# Patient Record
Sex: MALE | Race: WHITE | NOT HISPANIC OR LATINO | Employment: OTHER | URBAN - METROPOLITAN AREA
[De-identification: names, ages, dates, MRNs, and addresses within clinical notes are randomized per-mention and may not be internally consistent; named-entity substitution may affect disease eponyms.]

---

## 2020-05-08 ENCOUNTER — CLINICAL SUPPORT (OUTPATIENT)
Dept: URGENT CARE | Facility: CLINIC | Age: 60
End: 2020-05-08
Payer: MEDICAID

## 2020-05-08 VITALS
HEART RATE: 89 BPM | TEMPERATURE: 97 F | BODY MASS INDEX: 35.93 KG/M2 | HEIGHT: 70 IN | WEIGHT: 251 LBS | OXYGEN SATURATION: 97 % | SYSTOLIC BLOOD PRESSURE: 140 MMHG | DIASTOLIC BLOOD PRESSURE: 100 MMHG

## 2020-05-08 DIAGNOSIS — M10.9 ACUTE GOUT, UNSPECIFIED CAUSE, UNSPECIFIED SITE: Primary | ICD-10-CM

## 2020-05-08 PROCEDURE — 99204 PR OFFICE/OUTPT VISIT, NEW, LEVL IV, 45-59 MIN: ICD-10-PCS | Mod: 25,S$GLB,, | Performed by: NURSE PRACTITIONER

## 2020-05-08 PROCEDURE — 99204 OFFICE O/P NEW MOD 45 MIN: CPT | Mod: 25,S$GLB,, | Performed by: NURSE PRACTITIONER

## 2020-05-08 RX ORDER — DEXAMETHASONE SODIUM PHOSPHATE 4 MG/ML
8 INJECTION, SOLUTION INTRA-ARTICULAR; INTRALESIONAL; INTRAMUSCULAR; INTRAVENOUS; SOFT TISSUE ONCE
Status: COMPLETED | OUTPATIENT
Start: 2020-05-08 | End: 2020-05-08

## 2020-05-08 RX ORDER — PREDNISONE 20 MG/1
40 TABLET ORAL DAILY
Qty: 14 TABLET | Refills: 0 | Status: SHIPPED | OUTPATIENT
Start: 2020-05-08 | End: 2020-05-15

## 2020-05-08 RX ORDER — INDOMETHACIN 50 MG/1
50 CAPSULE ORAL
Qty: 15 CAPSULE | Refills: 0 | Status: SHIPPED | OUTPATIENT
Start: 2020-05-08

## 2020-05-08 RX ADMIN — DEXAMETHASONE SODIUM PHOSPHATE 8 MG: 4 INJECTION, SOLUTION INTRA-ARTICULAR; INTRALESIONAL; INTRAMUSCULAR; INTRAVENOUS; SOFT TISSUE at 02:05

## 2020-05-08 NOTE — PROGRESS NOTES
"Subjective:       Patient ID: Ben Lerma is a 60 y.o. male.    Vitals:  height is 5' 10" (1.778 m) and weight is 113.9 kg (251 lb). His oral temperature is 97.4 °F (36.3 °C). His blood pressure is 140/100 (abnormal) and his pulse is 89. His oxygen saturation is 97%.     Chief Complaint: Knee Pain    Knee Pain    The incident occurred 2 days ago. There was no injury mechanism. The pain is present in the right knee. The quality of the pain is described as aching, shooting and stabbing. The pain is severe. The pain has been constant since onset. Associated symptoms include an inability to bear weight and muscle weakness. He reports no foreign bodies present. The symptoms are aggravated by movement and weight bearing. He has tried nothing for the symptoms.       Constitution: Negative for chills, fatigue and fever.   HENT: Negative for congestion and sore throat.    Neck: Negative for painful lymph nodes.   Cardiovascular: Negative for chest pain and leg swelling.   Eyes: Negative for double vision and blurred vision.   Respiratory: Negative for cough and shortness of breath.    Gastrointestinal: Negative for nausea, vomiting and diarrhea.   Genitourinary: Negative for dysuria, frequency and urgency.   Musculoskeletal: Positive for pain, joint pain and joint swelling. Negative for muscle cramps and muscle ache.   Skin: Negative for color change, pale and rash.   Allergic/Immunologic: Negative for seasonal allergies.   Neurological: Negative for dizziness, history of vertigo, light-headedness, passing out and headaches.   Hematologic/Lymphatic: Negative for swollen lymph nodes, easy bruising/bleeding and history of blood clots. Does not bruise/bleed easily.   Psychiatric/Behavioral: Negative for nervous/anxious, sleep disturbance and depression. The patient is not nervous/anxious.        Objective:      Physical Exam   Constitutional: He is oriented to person, place, and time. He appears well-developed and " well-nourished. He is cooperative.  Non-toxic appearance. He does not appear ill. No distress.   HENT:   Head: Normocephalic and atraumatic.   Right Ear: Hearing, tympanic membrane, external ear and ear canal normal.   Left Ear: Hearing, tympanic membrane, external ear and ear canal normal.   Nose: Nose normal. No mucosal edema, rhinorrhea or nasal deformity. No epistaxis. Right sinus exhibits no maxillary sinus tenderness and no frontal sinus tenderness. Left sinus exhibits no maxillary sinus tenderness and no frontal sinus tenderness.   Mouth/Throat: Uvula is midline, oropharynx is clear and moist and mucous membranes are normal. No trismus in the jaw. Normal dentition. No uvula swelling. No posterior oropharyngeal erythema.   Eyes: Conjunctivae and lids are normal. Right eye exhibits no discharge. Left eye exhibits no discharge. No scleral icterus.   Neck: Trachea normal, normal range of motion, full passive range of motion without pain and phonation normal. Neck supple.   Cardiovascular: Normal rate, regular rhythm, normal heart sounds, intact distal pulses and normal pulses.   Pulmonary/Chest: Effort normal and breath sounds normal. No respiratory distress.   Abdominal: Soft. Normal appearance and bowel sounds are normal. He exhibits no distension, no pulsatile midline mass and no mass. There is no tenderness.   Musculoskeletal: He exhibits no edema or deformity.        Right knee: He exhibits decreased range of motion and swelling.   Neurological: He is alert and oriented to person, place, and time. He exhibits normal muscle tone. Coordination normal.   Skin: Skin is warm, dry, intact, not diaphoretic and not pale.   Psychiatric: He has a normal mood and affect. His speech is normal and behavior is normal. Judgment and thought content normal. Cognition and memory are normal.   Nursing note and vitals reviewed.        Assessment:       1. Acute gout, unspecified cause, unspecified site        Plan:       Rx:  Norco 5/325 # 20  Acute gout, unspecified cause, unspecified site  -     dexamethasone injection 8 mg  -     indomethacin (INDOCIN) 50 MG capsule; Take 1 capsule (50 mg total) by mouth 3 (three) times daily with meals.  Dispense: 15 capsule; Refill: 0  -     predniSONE (DELTASONE) 20 MG tablet; Take 2 tablets (40 mg total) by mouth once daily. for 7 days  Dispense: 14 tablet; Refill: 0

## 2020-05-08 NOTE — PATIENT INSTRUCTIONS

## 2023-12-27 ENCOUNTER — OFFICE VISIT (OUTPATIENT)
Dept: RHEUMATOLOGY | Facility: CLINIC | Age: 63
End: 2023-12-27
Payer: MEDICARE

## 2023-12-27 VITALS
WEIGHT: 253.63 LBS | HEIGHT: 70 IN | BODY MASS INDEX: 36.31 KG/M2 | HEART RATE: 60 BPM | DIASTOLIC BLOOD PRESSURE: 90 MMHG | SYSTOLIC BLOOD PRESSURE: 155 MMHG

## 2023-12-27 DIAGNOSIS — R76.8 HEPATITIS C ANTIBODY TEST POSITIVE: ICD-10-CM

## 2023-12-27 DIAGNOSIS — M1A.9XX0 CHRONIC GOUT WITHOUT TOPHUS, UNSPECIFIED CAUSE, UNSPECIFIED SITE: ICD-10-CM

## 2023-12-27 DIAGNOSIS — I10 HYPERTENSION, UNSPECIFIED TYPE: ICD-10-CM

## 2023-12-27 DIAGNOSIS — L40.50 PSORIATIC ARTHRITIS: Primary | ICD-10-CM

## 2023-12-27 DIAGNOSIS — Z71.89 COUNSELING AND COORDINATION OF CARE: ICD-10-CM

## 2023-12-27 DIAGNOSIS — R76.8 HEPATITIS B CORE ANTIBODY POSITIVE: ICD-10-CM

## 2023-12-27 DIAGNOSIS — L40.9 PSORIASIS: ICD-10-CM

## 2023-12-27 PROCEDURE — 99999 PR PBB SHADOW E&M-NEW PATIENT-LVL V: ICD-10-PCS | Mod: PBBFAC,,, | Performed by: STUDENT IN AN ORGANIZED HEALTH CARE EDUCATION/TRAINING PROGRAM

## 2023-12-27 PROCEDURE — 99205 PR OFFICE/OUTPT VISIT, NEW, LEVL V, 60-74 MIN: ICD-10-PCS | Mod: S$PBB,,, | Performed by: STUDENT IN AN ORGANIZED HEALTH CARE EDUCATION/TRAINING PROGRAM

## 2023-12-27 PROCEDURE — 99999PBSHW PR PBB SHADOW TECHNICAL ONLY FILED TO HB: ICD-10-PCS | Mod: PBBFAC,,,

## 2023-12-27 PROCEDURE — 99205 OFFICE O/P NEW HI 60 MIN: CPT | Mod: 25,PBBFAC,PN | Performed by: STUDENT IN AN ORGANIZED HEALTH CARE EDUCATION/TRAINING PROGRAM

## 2023-12-27 PROCEDURE — 99205 OFFICE O/P NEW HI 60 MIN: CPT | Mod: S$PBB,,, | Performed by: STUDENT IN AN ORGANIZED HEALTH CARE EDUCATION/TRAINING PROGRAM

## 2023-12-27 PROCEDURE — 99999 PR PBB SHADOW E&M-NEW PATIENT-LVL V: CPT | Mod: PBBFAC,,, | Performed by: STUDENT IN AN ORGANIZED HEALTH CARE EDUCATION/TRAINING PROGRAM

## 2023-12-27 PROCEDURE — 96372 THER/PROPH/DIAG INJ SC/IM: CPT | Mod: PBBFAC,PN

## 2023-12-27 PROCEDURE — 99999PBSHW PR PBB SHADOW TECHNICAL ONLY FILED TO HB: Mod: PBBFAC,,,

## 2023-12-27 RX ORDER — METFORMIN HYDROCHLORIDE 500 MG/1
500 TABLET, EXTENDED RELEASE ORAL
COMMUNITY
End: 2024-02-23 | Stop reason: SDUPTHER

## 2023-12-27 RX ORDER — AMLODIPINE BESYLATE 5 MG/1
1 TABLET ORAL
COMMUNITY
Start: 2023-09-13 | End: 2024-02-23

## 2023-12-27 RX ORDER — LISINOPRIL 10 MG/1
10 TABLET ORAL DAILY
Qty: 30 TABLET | Refills: 3 | Status: SHIPPED | OUTPATIENT
Start: 2023-12-27

## 2023-12-27 RX ORDER — ONDANSETRON 4 MG/1
4 TABLET, FILM COATED ORAL
COMMUNITY
Start: 2023-04-12 | End: 2024-02-23

## 2023-12-27 RX ORDER — PREDNISONE 5 MG/1
TABLET ORAL
Qty: 49 TABLET | Refills: 0 | Status: SHIPPED | OUTPATIENT
Start: 2023-12-27 | End: 2024-01-24

## 2023-12-27 RX ORDER — PREDNISONE 20 MG/1
TABLET ORAL
COMMUNITY
Start: 2023-09-13 | End: 2023-12-27

## 2023-12-27 RX ORDER — CETIRIZINE HYDROCHLORIDE 10 MG/1
10 TABLET ORAL EVERY MORNING
COMMUNITY
Start: 2023-09-05

## 2023-12-27 RX ORDER — APREMILAST 30 MG/1
1 TABLET, FILM COATED ORAL 2 TIMES DAILY
COMMUNITY
End: 2024-02-23 | Stop reason: SDUPTHER

## 2023-12-27 RX ORDER — KETOROLAC TROMETHAMINE 30 MG/ML
30 INJECTION, SOLUTION INTRAMUSCULAR; INTRAVENOUS
Status: COMPLETED | OUTPATIENT
Start: 2023-12-27 | End: 2023-12-27

## 2023-12-27 RX ORDER — LISINOPRIL 10 MG/1
1 TABLET ORAL DAILY
COMMUNITY
Start: 2023-01-13 | End: 2023-12-27 | Stop reason: SDUPTHER

## 2023-12-27 RX ADMIN — KETOROLAC TROMETHAMINE 30 MG: 60 INJECTION, SOLUTION INTRAMUSCULAR at 02:12

## 2023-12-27 NOTE — PATIENT INSTRUCTIONS
Continue taking otezla 30 mg twice a day   Do lab work and XR today   Prednisone taper for 1 month   Referral to liver doctor (hepatologist) and infectious diseases doctor for the hepatitis test

## 2023-12-27 NOTE — PROGRESS NOTES
RHEUMATOLOGY OUTPATIENT CLINIC NOTE    12/27/2023    Attending Rheumatologist: Norma Mendoza  Primary Care Provider: No, Primary Doctor   Physician Requesting Consultation: Self, Aaareferral  No address on file  Chief Complaint/Reason For Consultation:  Psoriatic Arthritis      Subjective:       HPI  Ben Lerma is a 63 y.o. White male with hypertension, diabetes who comes for evaluation of psoriatic arthritis     Diagnosis of PsO about 3-4 years ago, involving knees, legs, elbows, low back, scalp. Started on Otezla about a year ago but has not taken it consistently due to some diarrhea and upset stomach. He takes it at least 3 times per week. Reports that sometimes diarrhea is not present when he takes it.   He has a history of positive hep C antibody with negative VL and positive hep B core antibody. Because of this, no other treatment options have been explored yet. He has not seen hepatologist or ID.   He has history of gout for many years, affecting both feet and knees. Has taken indocin and colchicine in the past. Indocin caused mood changes and colchicine gave him upset stomach. Took allopurinol for a while many months ago. Last flare a couple of months ago.   Reports new onset bilateral wrist pain and swelling, also noted pain in multiple knuckles. Reports occasional pain in low back, no buttock pain. Morning stiffness of about 5 min.     He quit smoking cigarettes a while ago. Now only smokes marihuana. She does not drink alcohol. He is retired, used to work offshore. He rides a motocycle and has pain in hands with riding.     Pertinent labs and imaging  10/2022  Hep C antibody reactive  Hep C viral load negative  Hepatitis B core antibody reactive  Hepatitis B e Ab, surface antibody negative  Quantiferon negative  KALEY 1:160 homogeneous   Uric acid 10.7    Chronic comorbid conditions affecting medical decision making today:  Past Medical History:   Diagnosis Date    Allergy      Arthritis     Hypertension     Unspecified viral hepatitis C without hepatic coma      Past Surgical History:   Procedure Laterality Date    DENTAL SURGERY       History reviewed. No pertinent family history.  Social History     Substance and Sexual Activity   Alcohol Use Not Currently     Social History     Tobacco Use   Smoking Status Former    Types: Cigarettes   Smokeless Tobacco Not on file     Social History     Substance and Sexual Activity   Drug Use Yes    Types: Marijuana       Current Outpatient Medications:     cetirizine (ZYRTEC) 10 MG tablet, Take 10 mg by mouth every morning., Disp: , Rfl:     ondansetron (ZOFRAN) 4 MG tablet, Take 4 mg by mouth every 24 hours as needed., Disp: , Rfl:     OTEZLA 30 mg Tab, Take 1 tablet by mouth 2 (two) times daily., Disp: , Rfl:     amLODIPine (NORVASC) 5 MG tablet, 1 tablet., Disp: , Rfl:     indomethacin (INDOCIN) 50 MG capsule, Take 1 capsule (50 mg total) by mouth 3 (three) times daily with meals. (Patient not taking: Reported on 12/27/2023), Disp: 15 capsule, Rfl: 0    lisinopriL 10 MG tablet, Take 1 tablet (10 mg total) by mouth once daily., Disp: 30 tablet, Rfl: 3    metFORMIN (GLUCOPHAGE-XR) 500 MG ER 24hr tablet, Take 500 mg by mouth., Disp: , Rfl:     predniSONE (DELTASONE) 5 MG tablet, Take 3 tablets (15 mg total) by mouth once daily for 7 days, THEN 2 tablets (10 mg total) once daily for 7 days, THEN 1 tablet (5 mg total) once daily for 14 days., Disp: 49 tablet, Rfl: 0  No current facility-administered medications for this visit.     Objective:         Vitals:    12/27/23 1304   BP: (!) 155/90   Pulse: 60     Physical Exam   Constitutional: He is oriented to person, place, and time. He appears well-developed.   HENT:   Head: Normocephalic.   Mouth/Throat: Mucous membranes are moist.   Pulmonary/Chest: Effort normal.   Abdominal: Soft.   Musculoskeletal:      Cervical back: Neck supple.      Comments: Cspine FROM no tenderness  Tspine FROM no  "tenderness  Lspine FROM no tenderness.  Shoulders: FROM; no synovitis;  Elbows: FROM; no synovitis; no tophi or nodules  Wrists: synovitis in bilateral wrist  MCPs: FROM; synovitis in right 2 and 4    PIPs:FROM; synovitis in right 2 and 4   DIPs: FROM; no synovitis;   HIPS: FROM  Knees: FROM; no synovitis; no instability  Ankles: FROM: no synovitis   Toes: no tenderness on palpation.     Lymphadenopathy:     He has no cervical adenopathy.   Neurological: He is alert and oriented to person, place, and time.   Skin: No rash noted.   Multiple areas of psoriatic plaques. See pictures below    Vitals reviewed.       Media Information                    Reviewed old and all outside pertinent medical records available.    All lab results personally reviewed and interpreted by me.  Lab Results   Component Value Date    WBC 11.19 12/27/2023    HGB 13.8 (L) 12/27/2023    HCT 40.2 12/27/2023    MCV 92 12/27/2023    MCH 31.7 (H) 12/27/2023    MCHC 34.3 12/27/2023    RDW 13.3 12/27/2023     12/27/2023    MPV 10.0 12/27/2023    NEUTROABS 4.1 10/12/2022    LYMPHOPCT 39 10/12/2022       No results found for: "NA", "K", "CL", "CO2", "GLU", "BUN", "LABCREA", "CALCIUM", "PROT", "ALBUMIN", "BILITOT", "AST", "ALKPHOS", "ALT", "GFRAA", "GFRNONAA"    No results found for: "COLORU", "APPEARANCEUA", "SPECGRAV", "PHUR", "PHUA", "PROTEINUA", "GLUCOSEU", "KETONESU", "BLOODU", "LEUKOCYTESUR", "NITRITE", "UROBILINOGEN"    No results found for: "CRP"    No results found for: "KALEY", "RF", "SEDRATE", "CCPANTIBODIE"    No components found for: "25OHVITDTOT", "22HHNDEV7", "30YNQIXL5", "METHODNOTE"    No results found for: "URICACID"    No results found for: "QUANTIFERON", "HEPBCOREIGG", "HEPBSAB", "HEPBSAG", "HEPCAB"    Imaging:  All imaging reviewed and independently interpreted by me.         ASSESSMENT / PLAN:     Ben Lerma is a 63 y.o. White male with  hypertension, diabetes who comes for evaluation of psoriatic arthritis     1. " Psoriasis  -Extensive body surface involvement.   -On otezla BID but not taking consistently. Advised him to start taking it from now on. Previously limited therapies due to history of positive Hep C AB and positive hepatitis B core AB so he was started on otezla only  -Will obtain labs again now    2. Psoriatic arthritis, new diagnosis  -Moderate to high disease activity  -start prednisone taper for 28 days  -Hopefully otezla can help.   -Favor IL-17 vs. TNF due to hx of positive hepatitis B core Ab. Will wait on ID and hepatology eval  -Obtain baseline XR today     3. History of gout   -SUA 10.7 in 2022  -has had many attacks in feet and knees.   -repeat SUA now    4. Hepatitis C antibody test positive  -VL in 10/2022 negative  -Referral to ID and hepatology    5. Hepatitis B core antibody positive  -Test was positive in 10/2022. Negative HepB surface Antigen  -Referral to ID and hepatology     6. Hypertension, uncontrolled  -Will refill lisinopril now.   -advised him to establish care with PCP.     6. Other specified counseling  - over 10 minutes spent regarding below topics:  - Immunization counseling done.  - Weight loss counseling done.  - Nutrition and exercise counseling.  - Limitation of alcohol consumption.  - Regular exercise:  Aerobic and resistance.  - Medication counseling provided.    . Tobacco use  - at least 3 minutes spent on this topic  - smoking cessation encouraged.  - consider referral to smoking cessation clinic.    . Morbid Obesity  - would benefit from decreasing at least 10% of body weight.  - recommended goal of losing 1 lb per week.  - consider nutritionist evaluation.  - would consider screening for CASSI per PMD.    Follow up in about 6 weeks (around 2/7/2024).    Method of contact with patient concerns: [facility]    Disclaimer:  This note is prepared using voice recognition software and as such is likely to have errors and has not been proof read. Please contact me for  questions.     Time spent: 60 minutes in face to face discussion concerning diagnosis, prognosis, review of lab and test results, benefits of treatment as well as management of disease, counseling of patient and coordination of care between various health care providers.  Greater than half the time spent was used for coordination of care and counseling of patient.    Norma Mendoza M.D.  Rheumatology  [facility]

## 2023-12-28 ENCOUNTER — LAB VISIT (OUTPATIENT)
Dept: LAB | Facility: HOSPITAL | Age: 63
End: 2023-12-28
Attending: STUDENT IN AN ORGANIZED HEALTH CARE EDUCATION/TRAINING PROGRAM
Payer: MEDICARE

## 2023-12-28 DIAGNOSIS — L40.50 PSORIATIC ARTHROPATHY: Primary | ICD-10-CM

## 2023-12-28 DIAGNOSIS — L40.50 PSORIATIC ARTHRITIS: ICD-10-CM

## 2023-12-28 PROCEDURE — 36415 COLL VENOUS BLD VENIPUNCTURE: CPT | Performed by: STUDENT IN AN ORGANIZED HEALTH CARE EDUCATION/TRAINING PROGRAM

## 2023-12-28 PROCEDURE — 86480 TB TEST CELL IMMUN MEASURE: CPT | Performed by: STUDENT IN AN ORGANIZED HEALTH CARE EDUCATION/TRAINING PROGRAM

## 2023-12-29 ENCOUNTER — TELEPHONE (OUTPATIENT)
Dept: RHEUMATOLOGY | Facility: CLINIC | Age: 63
End: 2023-12-29
Payer: MEDICARE

## 2023-12-29 DIAGNOSIS — R79.89 ELEVATED SERUM CREATININE: Primary | ICD-10-CM

## 2023-12-29 NOTE — TELEPHONE ENCOUNTER
Spoke to patient's wife and relayed all results of labs and xrays to her per Dr. Neumann. Also informed her that Dr. Neumann wants to repeat the kidney function test in one week. Patient's wife voiced understanding.

## 2023-12-29 NOTE — TELEPHONE ENCOUNTER
----- Message from Norma Mendoza MD sent at 12/29/2023  9:18 AM CST -----  Please contact the patient with the attached results. Markers of inflammation are elevated which is expected given the psoriasis and joint pain. Hepatitis test came back similar to prior, there is no active infection. Rheumatoid arthritis test were negative. There are other test that are pending and I will let him know when they are back. No changes in the plan so far, continue taking the otezla everyday

## 2024-01-03 ENCOUNTER — TELEPHONE (OUTPATIENT)
Dept: RHEUMATOLOGY | Facility: CLINIC | Age: 64
End: 2024-01-03
Payer: MEDICARE

## 2024-01-03 LAB
GAMMA INTERFERON BACKGROUND BLD IA-ACNC: 0.03 IU/ML
M TB IFN-G BLD-IMP: NEGATIVE
M TB IFN-G CD4+ T-CELLS BLD-ACNC: 0.04 IU/ML
M TBIFN-G CD4+ CD8+T-CELLS BLD-ACNC: 0.04 IU/ML
MITOGEN IGNF BLD-ACNC: >10 IU/ML
QUANTIFERON CRITERIA: NORMAL

## 2024-01-03 NOTE — TELEPHONE ENCOUNTER
----- Message from Norma Mendoza MD sent at 1/3/2024 12:41 PM CST -----  Please let patient know that his tuberculosis test was negative and remind him to do the repeat test for the kidney sometime this week

## 2024-01-05 ENCOUNTER — LAB VISIT (OUTPATIENT)
Dept: LAB | Facility: HOSPITAL | Age: 64
End: 2024-01-05
Attending: STUDENT IN AN ORGANIZED HEALTH CARE EDUCATION/TRAINING PROGRAM
Payer: MEDICARE

## 2024-01-05 ENCOUNTER — TELEPHONE (OUTPATIENT)
Dept: RHEUMATOLOGY | Facility: CLINIC | Age: 64
End: 2024-01-05
Payer: MEDICARE

## 2024-01-05 DIAGNOSIS — R79.89 ELEVATED SERUM CREATININE: ICD-10-CM

## 2024-01-05 LAB
ANION GAP SERPL CALC-SCNC: 6 MMOL/L (ref 8–16)
BUN SERPL-MCNC: 37 MG/DL (ref 8–23)
CALCIUM SERPL-MCNC: 10.1 MG/DL (ref 8.7–10.5)
CHLORIDE SERPL-SCNC: 107 MMOL/L (ref 95–110)
CO2 SERPL-SCNC: 25 MMOL/L (ref 23–29)
CREAT SERPL-MCNC: 1.9 MG/DL (ref 0.5–1.4)
EST. GFR  (NO RACE VARIABLE): 38.9 ML/MIN/1.73 M^2
GLUCOSE SERPL-MCNC: 96 MG/DL (ref 70–110)
POTASSIUM SERPL-SCNC: 5.2 MMOL/L (ref 3.5–5.1)
SODIUM SERPL-SCNC: 138 MMOL/L (ref 136–145)

## 2024-01-05 PROCEDURE — 36415 COLL VENOUS BLD VENIPUNCTURE: CPT | Performed by: STUDENT IN AN ORGANIZED HEALTH CARE EDUCATION/TRAINING PROGRAM

## 2024-01-05 PROCEDURE — 80048 BASIC METABOLIC PNL TOTAL CA: CPT | Performed by: STUDENT IN AN ORGANIZED HEALTH CARE EDUCATION/TRAINING PROGRAM

## 2024-01-05 NOTE — TELEPHONE ENCOUNTER
----- Message from Norma Mendoza MD sent at 1/5/2024 11:24 AM CST -----  Please contact the patient with the attached results. Similar findings as prior, this may be chronic. Potassium is minimally elevated. Will need to recheck lab again next week. Will send him to the kidney doctor.

## 2024-01-25 NOTE — PROGRESS NOTES
[facility] PRE-BIOLOGIC INFECTIOUS DISEASE CONSULT    Reason for Visit:  Pre-transplant evaluation  Referring Provider: Dr. Norma Chan*     History of Present Illness:    63 y.o. male with a history of psoriatic arthritis who was referred by Rheumatology for pre-biologic evaluation.  He is  currently on Otezla.  Recently finished prednisone taper.  Possible switch to biologic therapy.      Also with history of positive HCV antibody, negative viral load and + hep b core antibody (negative antigen).  He has never seen Hepatology.      Infectious History:  Recent hospital admissions: No.  Recent ED visit for URI and currently on doxy/augmentin.  CXR that visit clear.  Recent infections: Current URI  Recent or current antibiotic use: Yes.  Current abx use for URI  History of recurrent infections *(sinus / pneumonia / UTI / SBP)*: No  History of diabetic foot wound or bone/joint infection: No  Recent dental infections, issues or procedures: No.  Dental issues.  Upper dentures.  Only two lower teeth   History of chicken pox: Yes  History of shingles: No  History of STI: Yes  gonorrhea.  Treated   History of COVID infection: No    History of Immunosuppression:  Prior chemotherapy / immunosuppression: No  Prior transplant: No  History of splenectomy: No    Tuberculosis:  Prior screening for latent TB: Yes  Prior diagnosis of latent TB: No  Risk factors for TB *known exposure, incarceration, homelessness*: Yes  Incarceration 6 months very remotely    Geographical exposures:  Currently lives in [place]  with wife and adult daughter/adult son and grandkids (18 mos and 5 years)   Lived in the following states: [place] ( [place]) 1 year, [place], [place], [place]   Lived or travelled to the [facility] [place]: Yes  International travel: Yes.  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 Fishing  Hobbies: Motorcycling  Water: Well water.      Consumption of raw/undercooked meat or seafood?  No  Tobacco: No  Alcohol: No  Recreational drug use:  Yes.    Smokes cannabis .  Counseled  Sexual partners: Women.  One partner.       Past Histories:   Past Medical History:   Diagnosis Date    Allergy     Arthritis     Hypertension     Unspecified viral hepatitis C without hepatic coma      Past Surgical History:   Procedure Laterality Date    DENTAL SURGERY       History reviewed. No pertinent family history.  Social History     Tobacco Use    Smoking status: Former     Types: Cigarettes   Substance Use Topics    Alcohol use: Not Currently    Drug use: Yes     Types: Marijuana     Review of patient's allergies indicates:  No Known Allergies      Immunization History:  Received all childhood vaccines: Yes  All household members receive annual flu vaccine: No  All household members are up to date on COVID vaccine: No      Current antibiotics:  Antibiotics (From admission, onward)      None        Augmentin and doxycycline     Review of Systems   Constitutional: Negative for chills, decreased appetite, fever, malaise/fatigue, night sweats, weight gain and weight loss.   HENT:  Positive for tinnitus. Negative for congestion, ear pain, hearing loss, hoarse voice and sore throat.    Eyes:  Negative for blurred vision, redness and visual disturbance.   Cardiovascular:  Negative for chest pain, leg swelling and palpitations.   Respiratory:  Positive for cough and wheezing. Negative for hemoptysis, shortness of breath and sputum production.    Endocrine: Negative for cold intolerance and heat intolerance.   Hematologic/Lymphatic: Negative for adenopathy. Does not bruise/bleed easily.   Skin:  Negative for dry skin, itching, rash and suspicious lesions.   Musculoskeletal:  Positive for joint pain. Negative for back  pain, myalgias and neck pain.   Gastrointestinal:  Positive for diarrhea (from Otezla - loose) and nausea. Negative for abdominal pain, constipation, heartburn and vomiting.   Genitourinary:  Negative for dysuria, flank pain, frequency, hematuria, hesitancy and urgency.   Neurological:  Positive for headaches. Negative for dizziness, numbness, paresthesias and weakness.   Psychiatric/Behavioral:  Negative for depression and memory loss. The patient does not have insomnia and is not nervous/anxious.    Allergic/Immunologic: Negative for environmental allergies, HIV exposure, hives and persistent infections.          Objective  Physical Exam  Constitutional:       General: He is not in acute distress.     Appearance: Normal appearance. He is not ill-appearing.   HENT:      Head: Normocephalic and atraumatic.      Mouth/Throat:      Mouth: Mucous membranes are moist.      Comments: Poor dentition    Eyes:      General: No scleral icterus.     Conjunctiva/sclera: Conjunctivae normal.   Cardiovascular:      Rate and Rhythm: Normal rate.   Pulmonary:      Effort: Pulmonary effort is normal. No respiratory distress.      Breath sounds: No stridor. No wheezing.   Musculoskeletal:      Cervical back: Normal range of motion.      Right lower leg: No edema.      Left lower leg: No edema.   Skin:     General: Skin is warm and dry.      Comments: Psoriasis plaques bilateral elbows, mid back, RLE   Neurological:      Mental Status: He is alert and oriented to person, place, and time.   Psychiatric:         Mood and Affect: Mood normal.         Behavior: Behavior normal.         Thought Content: Thought content normal.         Judgment: Judgment normal.           Labs:    CBC:   Lab Results   Component Value Date    WBC 21.97 (H) 01/24/2024    HGB 14.3 01/24/2024    HCT 42.5 01/24/2024    MCV 93 01/24/2024     01/24/2024    GRAN 18.4 (H) 01/24/2024    GRAN 83.6 (H) 01/24/2024    LYMPH 2.1 01/24/2024    LYMPH 9.6 (L)  "01/24/2024    MONO 1.3 (H) 01/24/2024    MONO 5.9 01/24/2024    EOSINOPHIL 0.1 01/24/2024       Syphilis screening: No results found for: "RPR", "PRPQ", "FTAABS"     TB screening: No results found for: "TBGOLDPLUS", "TSPOTSCREN"    HIV screening: No results found for: "KNI63CKLF"    Strongyloides IgG: No results found for: "STRONGANTIGG"    Hepatitis Serologies:   Lab Results   Component Value Date    HEPBCAB Reactive (A) 12/27/2023    HEPBSAB 40.17 12/27/2023    HEPBSAB Reactive 12/27/2023    HEPCAB Reactive (A) 12/27/2023        Varicella IgG: No results found for: "VARICELLAINT"      Immunization History   Administered Date(s) Administered    COVID-19, vector-nr, rS-Ad26, PF ("[employer] 03/11/2021      Flu - denies  COVID - primary series J&J, then one booster   Hep A - unknown  Hep B - unknown  PNA - unknown  TDAP - unknown  Shingles - denies    Assessment and Plan    1. Risks of Infection: Available serologies were reviewed. No unusual risks of infection or significant barriers to biologic/DMT were identified from the infectious disease standpoint given the information available at this time and subject to the following   - Acute infectious issues:  Current URI on abx tx.     - Screening for Chronic infection:  -  Quantiferon Gold negative   -  Anti-HBc positive/HBsAg negative - Risk of HBV reactivation with certain immunosuppressive regimens (e.g. corticosteroids, Anti-CD20 rituximab, anti-TNF).  Recommend Hepatology evaluation /risk stratification for prophylactic HBV therapy vs regular monitoring of HBV DNA.   Hepatology consult pending for April   -  HCV antibody positive/viral load negative - Recommend Hepatology evaluation.  Appointment pending for April  -  Additional serologies ordered:  Hepatitis A Ab, HIV, RPR, Strongyloides IgG, and VZV IgG, Coccidioides (history of living in endemic area)       2. Immunizations:  Based on the patient's immunization history and serologies, the following immunizations " are recommended:  - Hepatitis A    Hep A IgG ordered.  If negative will order vaccine.     Vaccination ordered today: No. Reason for not ordering: Pending serology   - Hepatitis B    -See above.  Core/surface antibody positive indicative of cleared infection. No vaccination.       - COVID    Current CDC vaccination recommendations were discussed with the patient. Recommend updated COVID vaccine   - Annual high dose influenza     Vaccination ordered today: Yes   - Prevnar 20    Vaccination ordered today: Yes   - Tdap    Vaccination ordered today: Yes   - Shingrix    Vaccination ordered today: Yes    Patient will receive vaccines at local pharmacy. A written prescription was provided for all vaccine doses.   Patient advised he can schedule here if he wishes.   Advised to defer vaccines until current URI resolves and he completes antibiotic therapy.     3. Counseling:   I discussed with the patient the risk for increased susceptibility to infections with biologic therapies.   The patient has been counseled on the importance of vaccinations to decrease risk of infection and severe illness. Specific guidance has been provided to the patient regarding the patient's occupation, hobbies and activities to avoid future infectious complications.   Specifically counseled regarding risks of fungal pulmonary infections with inhaled cannabis.     Follow up with infectious disease as needed.       The total time for evaluation and management services performed on 01/26/2024 was greater than 45 minutes.

## 2024-01-26 ENCOUNTER — OFFICE VISIT (OUTPATIENT)
Dept: INFECTIOUS DISEASES | Facility: CLINIC | Age: 64
End: 2024-01-26
Payer: MEDICARE

## 2024-01-26 VITALS
SYSTOLIC BLOOD PRESSURE: 131 MMHG | HEART RATE: 96 BPM | DIASTOLIC BLOOD PRESSURE: 87 MMHG | TEMPERATURE: 98 F | HEIGHT: 70 IN | BODY MASS INDEX: 35.98 KG/M2 | WEIGHT: 251.31 LBS

## 2024-01-26 DIAGNOSIS — D84.9 ACQUIRED IMMUNOCOMPROMISED STATE: Primary | ICD-10-CM

## 2024-01-26 DIAGNOSIS — L40.50 PSORIATIC ARTHRITIS: ICD-10-CM

## 2024-01-26 PROCEDURE — 99204 OFFICE O/P NEW MOD 45 MIN: CPT | Mod: S$PBB,,, | Performed by: NURSE PRACTITIONER

## 2024-01-26 PROCEDURE — 99214 OFFICE O/P EST MOD 30 MIN: CPT | Mod: PBBFAC | Performed by: NURSE PRACTITIONER

## 2024-01-26 PROCEDURE — 99999 PR PBB SHADOW E&M-EST. PATIENT-LVL IV: CPT | Mod: PBBFAC,,, | Performed by: NURSE PRACTITIONER

## 2024-01-26 NOTE — PATIENT INSTRUCTIONS
Additional serologies :  Hep A IgG, Strongyloides, RPR, HIV, coccidioides antibody.  You'll do these next Friday 2/2 at [place].   Recommended vaccines: Updated flu vaccine, updated COVID, Prevnar 20, Tdap, Shingles vaccine (two doses 2 months apart).  If the Hepatitis A IgG is negative, you'll need hepatitis A vaccine.  I'll let you know.   Keep Hepatology visit with ANGELICA Cuevas in April.   I'll call you if there is anything of concern on your blood work.  Call me with any questions/concerns.

## 2024-01-26 NOTE — Clinical Note
Hi Dr. Wakefield -   I saw Mr. Lerma for pre-biologic clearance and ordered some additional pre-biologic serologies as well as immunizations. ID does not manage HCV or HBV issues and will defer to Hepatology. He is scheduled to see them in April. I guess the question for them will be, with respect to the positive Hep B core antibody,  is whether he would need serial monitoring of HBV DNA or reactivation prophylaxis?  Guess that would depend on the biologic used.  He tells me his symptoms have improved now that he is taking the Otezla correctly.  Please message me with questions/concerns.  Thanks!  Montrell Alberts NP Infectious Disease

## 2024-02-08 ENCOUNTER — TELEPHONE (OUTPATIENT)
Dept: INFECTIOUS DISEASES | Facility: CLINIC | Age: 64
End: 2024-02-08
Payer: MEDICARE

## 2024-02-08 NOTE — TELEPHONE ENCOUNTER
----- Message from JIM Madison, ANP sent at 2/6/2024 10:34 AM CST -----  Rafaeal:  Please call this patient.  He was supposed to have labs done at [facility] on 2/2 and was a no show.  Can you re-schedule?  Thanks.

## 2024-02-19 ENCOUNTER — PATIENT MESSAGE (OUTPATIENT)
Dept: INFECTIOUS DISEASES | Facility: CLINIC | Age: 64
End: 2024-02-19
Payer: MEDICARE

## 2024-02-19 ENCOUNTER — TELEPHONE (OUTPATIENT)
Dept: INFECTIOUS DISEASES | Facility: HOSPITAL | Age: 64
End: 2024-02-19
Payer: MEDICARE

## 2024-02-23 ENCOUNTER — OFFICE VISIT (OUTPATIENT)
Dept: RHEUMATOLOGY | Facility: CLINIC | Age: 64
End: 2024-02-23
Payer: MEDICARE

## 2024-02-23 VITALS
SYSTOLIC BLOOD PRESSURE: 138 MMHG | DIASTOLIC BLOOD PRESSURE: 90 MMHG | BODY MASS INDEX: 35.49 KG/M2 | HEART RATE: 87 BPM | WEIGHT: 247.94 LBS | HEIGHT: 70 IN

## 2024-02-23 DIAGNOSIS — L40.9 PSORIASIS: ICD-10-CM

## 2024-02-23 DIAGNOSIS — L40.50 PSORIATIC ARTHRITIS: ICD-10-CM

## 2024-02-23 DIAGNOSIS — Z71.89 COUNSELING AND COORDINATION OF CARE: ICD-10-CM

## 2024-02-23 DIAGNOSIS — R76.8 HEPATITIS B CORE ANTIBODY POSITIVE: ICD-10-CM

## 2024-02-23 DIAGNOSIS — R76.8 HEPATITIS C ANTIBODY TEST POSITIVE: ICD-10-CM

## 2024-02-23 DIAGNOSIS — M1A.9XX0 CHRONIC GOUT WITHOUT TOPHUS, UNSPECIFIED CAUSE, UNSPECIFIED SITE: Primary | ICD-10-CM

## 2024-02-23 DIAGNOSIS — E66.01 OBESITY, MORBID: ICD-10-CM

## 2024-02-23 DIAGNOSIS — E11.9 TYPE 2 DIABETES MELLITUS WITHOUT COMPLICATION, WITHOUT LONG-TERM CURRENT USE OF INSULIN: ICD-10-CM

## 2024-02-23 DIAGNOSIS — R79.89 ELEVATED SERUM CREATININE: ICD-10-CM

## 2024-02-23 PROCEDURE — 99999 PR PBB SHADOW E&M-EST. PATIENT-LVL III: CPT | Mod: PBBFAC,,, | Performed by: STUDENT IN AN ORGANIZED HEALTH CARE EDUCATION/TRAINING PROGRAM

## 2024-02-23 PROCEDURE — 99213 OFFICE O/P EST LOW 20 MIN: CPT | Mod: PBBFAC,PN | Performed by: STUDENT IN AN ORGANIZED HEALTH CARE EDUCATION/TRAINING PROGRAM

## 2024-02-23 PROCEDURE — 99215 OFFICE O/P EST HI 40 MIN: CPT | Mod: S$PBB,,, | Performed by: STUDENT IN AN ORGANIZED HEALTH CARE EDUCATION/TRAINING PROGRAM

## 2024-02-23 PROCEDURE — 99999PBSHW PR PBB SHADOW TECHNICAL ONLY FILED TO HB: Mod: PBBFAC,,,

## 2024-02-23 PROCEDURE — 96372 THER/PROPH/DIAG INJ SC/IM: CPT | Mod: PBBFAC,PN

## 2024-02-23 RX ORDER — TRIAMCINOLONE ACETONIDE 40 MG/ML
40 INJECTION, SUSPENSION INTRA-ARTICULAR; INTRAMUSCULAR
Status: COMPLETED | OUTPATIENT
Start: 2024-02-23 | End: 2024-02-23

## 2024-02-23 RX ORDER — ALLOPURINOL 100 MG/1
50 TABLET ORAL DAILY
Qty: 30 TABLET | Refills: 0 | Status: SHIPPED | OUTPATIENT
Start: 2024-02-23 | End: 2024-03-25 | Stop reason: SDUPTHER

## 2024-02-23 RX ORDER — METFORMIN HYDROCHLORIDE 500 MG/1
500 TABLET, EXTENDED RELEASE ORAL 2 TIMES DAILY WITH MEALS
Qty: 60 TABLET | Refills: 0 | Status: SHIPPED | OUTPATIENT
Start: 2024-02-23 | End: 2024-04-03

## 2024-02-23 RX ORDER — PREDNISONE 5 MG/1
5 TABLET ORAL DAILY
Qty: 60 TABLET | Refills: 2 | Status: SHIPPED | OUTPATIENT
Start: 2024-02-23 | End: 2024-08-21

## 2024-02-23 RX ORDER — APREMILAST 30 MG/1
1 TABLET, FILM COATED ORAL 2 TIMES DAILY
Qty: 60 TABLET | Refills: 2 | Status: ACTIVE | OUTPATIENT
Start: 2024-02-23

## 2024-02-23 RX ADMIN — TRIAMCINOLONE ACETONIDE 40 MG: 40 INJECTION, SUSPENSION INTRA-ARTICULAR; INTRAMUSCULAR at 11:02

## 2024-02-23 NOTE — PROGRESS NOTES
RHEUMATOLOGY OUTPATIENT CLINIC NOTE    2/23/2024    Attending Rheumatologist: Norma Mendoza  Primary Care Provider: No, Primary Doctor   Physician Requesting Consultation: No referring provider defined for this encounter.  Chief Complaint/Reason For Consultation:  Psoriatic Arthritis      Subjective:       HPI  Ben Lerma is a 64 y.o. White male with hypertension, diabetes who comes for evaluation of psoriatic arthritis     Interim history  -Initial visit on 12/2023  -Saw ID due to positive Hep B core Ab/HBsAg negative  and Hep C Ab positive/viral load negative. Recommended hepatology evaluation which he has scheduled for April to decide if he needs prophylactic HBV therapy vs regular monitoring of HBV DNA.   -Currently on otezla 30 mg BID since last visit.   -completed prednisone taper which helped his symptoms  -He feels that psoriasis has improved a little bit, still has many psoriasis plaques. Joint pain has improved. Reports bilateral wrist pain with swelling, has trouble sleeping due to this,  -Labs showed mildly elevated Creatinine. He was referred to nephrology to evaluate for possible CKD but has not seen them yet. LFTs mildly elevated.   -he will have complete pre biologic labs per ID today.   -reports that he has gout many gout flares too.     Disease history    Diagnosis of PsO about 3-4 years ago, involving knees, legs, elbows, low back, scalp. Started on Otezla about a year ago but has not taken it consistently due to some diarrhea and upset stomach. He takes it at least 3 times per week. Reports that sometimes diarrhea is not present when he takes it.   He has a history of positive hep C antibody with negative VL and positive hep B core antibody. Because of this, no other treatment options have been explored yet. He has not seen hepatologist or ID.   He has history of gout for many years, affecting both feet and knees. Has taken indocin and colchicine in the past. Indocin caused  mood changes and colchicine gave him upset stomach. Took allopurinol for a while many months ago. Last flare a couple of months ago.   Reports new onset bilateral wrist pain and swelling, also noted pain in multiple knuckles. Reports occasional pain in low back, no buttock pain. Morning stiffness of about 5 min.     He quit smoking cigarettes a while ago. Now only smokes marihuana. She does not drink alcohol. He is retired, used to work offshore. He rides a motocycle and has pain in hands with riding.     Pertinent labs and imaging  10/2022  Hep C antibody reactive  Hep C viral load negative  Hepatitis B core antibody reactive  Hepatitis B e Ab, surface antibody negative  Quantiferon negative  KALEY 1:160 homogeneous   Uric acid 10.7    12/2023  RF, CCP negative  ESR and CRP elevated  SUA 9.4    Chronic comorbid conditions affecting medical decision making today:  Past Medical History:   Diagnosis Date    Allergy     Arthritis     Hypertension     Unspecified viral hepatitis C without hepatic coma      Past Surgical History:   Procedure Laterality Date    DENTAL SURGERY       History reviewed. No pertinent family history.  Social History     Substance and Sexual Activity   Alcohol Use Not Currently     Social History     Tobacco Use   Smoking Status Former    Types: Cigarettes   Smokeless Tobacco Not on file     Social History     Substance and Sexual Activity   Drug Use Yes    Types: Marijuana       Current Outpatient Medications:     cetirizine (ZYRTEC) 10 MG tablet, Take 10 mg by mouth every morning., Disp: , Rfl:     indomethacin (INDOCIN) 50 MG capsule, Take 1 capsule (50 mg total) by mouth 3 (three) times daily with meals., Disp: 15 capsule, Rfl: 0    lisinopriL 10 MG tablet, Take 1 tablet (10 mg total) by mouth once daily., Disp: 30 tablet, Rfl: 3    allopurinoL (ZYLOPRIM) 100 MG tablet, Take 0.5 tablets (50 mg total) by mouth once daily., Disp: 30 tablet, Rfl: 0    metFORMIN (GLUCOPHAGE-XR) 500 MG ER 24hr  tablet, Take 1 tablet (500 mg total) by mouth 2 (two) times daily with meals., Disp: 60 tablet, Rfl: 0    OTEZLA 30 mg Tab, Take 1 tablet (30 mg total) by mouth 2 (two) times daily., Disp: 60 tablet, Rfl: 2    predniSONE (DELTASONE) 5 MG tablet, Take 1 tablet (5 mg total) by mouth once daily., Disp: 60 tablet, Rfl: 2  No current facility-administered medications for this visit.     Objective:         Vitals:    02/23/24 1050   BP: (!) 138/90   Pulse: 87     Physical Exam   Constitutional: He is oriented to person, place, and time. He appears well-developed.   HENT:   Head: Normocephalic.   Mouth/Throat: Mucous membranes are moist.   Pulmonary/Chest: Effort normal.   Abdominal: Soft.   Musculoskeletal:      Cervical back: Neck supple.      Comments: Cspine FROM no tenderness  Tspine FROM no tenderness  Lspine FROM no tenderness.  Shoulders: FROM; no synovitis;  Elbows: FROM; no synovitis; no tophi or nodules  Wrists: synovitis in bilateral wrist  MCPs: FROM; no tenderness or synovitis    PIPs:FROM; no synovitis   DIPs: FROM; no synovitis;   HIPS: FROM  Knees: FROM; no synovitis; no instability  Ankles: FROM: no synovitis   Toes: no tenderness on palpation.     Lymphadenopathy:     He has no cervical adenopathy.   Neurological: He is alert and oriented to person, place, and time.   Skin: No rash noted.   Multiple areas of psoriatic plaques that have improved mildly but still present. Less scaly lesions    Vitals reviewed.       Media Information from 12/2023                    Reviewed old and all outside pertinent medical records available.    All lab results personally reviewed and interpreted by me.  Lab Results   Component Value Date    WBC 21.97 (H) 01/24/2024    HGB 14.3 01/24/2024    HCT 42.5 01/24/2024    MCV 93 01/24/2024    MCH 31.2 (H) 01/24/2024    MCHC 33.6 01/24/2024    RDW 14.0 01/24/2024     01/24/2024    MPV 10.8 01/24/2024    NEUTROABS 4.1 10/12/2022    LYMPHOPCT 39 10/12/2022       Lab  "Results   Component Value Date     (L) 01/24/2024    K 4.9 01/24/2024     01/24/2024    CO2 22 01/24/2024     (H) 01/24/2024    BUN 28 (H) 01/24/2024    CALCIUM 9.7 01/24/2024    PROT 8.9 (H) 01/24/2024    ALBUMIN 4.5 01/24/2024    BILITOT 1.4 (H) 01/24/2024    AST 42 01/24/2024    ALKPHOS 120 01/24/2024    ALT 64 (H) 01/24/2024       No results found for: "COLORU", "APPEARANCEUA", "SPECGRAV", "PHUR", "PHUA", "PROTEINUA", "GLUCOSEU", "KETONESU", "BLOODU", "LEUKOCYTESUR", "NITRITE", "UROBILINOGEN"    Lab Results   Component Value Date    CRP 10.9 (H) 12/27/2023       Lab Results   Component Value Date    RF <13.0 12/27/2023    SEDRATE 54 (H) 12/27/2023    CCPANTIBODIE <0.5 12/27/2023       No components found for: "25OHVITDTOT", "98REZAIQ3", "98LWXQRL5", "METHODNOTE"    Lab Results   Component Value Date    URICACID 9.4 (H) 12/27/2023       Lab Results   Component Value Date    HEPBSAB 40.17 12/27/2023    HEPBSAB Reactive 12/27/2023    HEPBSAG Non-reactive 12/27/2023    HEPCAB Reactive (A) 12/27/2023       Imaging:  All imaging reviewed and independently interpreted by me.         ASSESSMENT / PLAN:     Ben Lerma is a 64 y.o. White male with  hypertension, diabetes who comes for evaluation of psoriatic arthritis     1. Psoriasis  -Extensive body surface involvement.   -has been taking otezla BID now consistently since last visit for about 8 weeks. Has noted discrete improvement.     2. Psoriatic arthritis  -Moderate disease activity today with synovitis in b/l wrist L>R  -Start prednisone 5 mg daily for   -Hopefully otezla can help.   -Favor IL-17 vs. TNF due to hx of positive hepatitis B core Ab.  Pending hepatology evaluation.  We will send him a message that were considering Cosentyx    3. History of gout   -SUA 10.7 in 2022 -> 9.4  -has had many attacks in feet and knees.   -start allopurinol 50 mg daily.  We will up titrate by 50 mg increments due to possible CKD.  -labs every 4 " weeks  -discussed about gout diet    4. Hepatitis C antibody test positive  -VL in 10/2022 negative  -Referral to ID and hepatology    5. Hepatitis B core antibody positive  -Test was positive in 10/2022. Negative HepB surface Antigen  -Referral to ID and hepatology     6. Hypertension and diabetes  -advised him to establish care with PCP.     7. Elevated creatinine  -appears to be chronic, may have CKD  -referral to Nephrology placed earlier this year    8. Other specified counseling  - over 10 minutes spent regarding below topics:  - Immunization counseling done.  - Weight loss counseling done.  - Nutrition and exercise counseling.  - Limitation of alcohol consumption.  - Regular exercise:  Aerobic and resistance.  - Medication counseling provided.    . Tobacco use  - at least 3 minutes spent on this topic  - smoking cessation encouraged.  - consider referral to smoking cessation clinic.    . Morbid Obesity  - would benefit from decreasing at least 10% of body weight.  - recommended goal of losing 1 lb per week.  - consider nutritionist evaluation.  - would consider screening for CASSI per PMD.    Follow up in about 2 months (around 4/30/2024).    Method of contact with patient concerns: [facility]    Disclaimer:  This note is prepared using voice recognition software and as such is likely to have errors and has not been proof read. Please contact me for questions.     Time spent: 30 minutes in face to face discussion concerning diagnosis, prognosis, review of lab and test results, benefits of treatment as well as management of disease, counseling of patient and coordination of care between various health care providers.  Greater than half the time spent was used for coordination of care and counseling of patient.    Norma Mendoza M.D.  Rheumatology  [facility]

## 2024-02-23 NOTE — PATIENT INSTRUCTIONS
Start allopurinol 0.5 tablet daily  Take prednisone 5 mg daily  Continue taking Otezla 30 mg BID  Labs every 4 weeks to monitor the uric acid  Follow up with hepatology - plan is to start cosentyx injections after  Need to establish care with primary care  I placed referral to nephrology   Follow up on last week of April

## 2024-03-18 ENCOUNTER — OFFICE VISIT (OUTPATIENT)
Dept: NEPHROLOGY | Facility: CLINIC | Age: 64
End: 2024-03-18
Payer: MEDICARE

## 2024-03-18 VITALS
SYSTOLIC BLOOD PRESSURE: 124 MMHG | HEIGHT: 70 IN | HEART RATE: 60 BPM | DIASTOLIC BLOOD PRESSURE: 76 MMHG | WEIGHT: 248 LBS | BODY MASS INDEX: 35.5 KG/M2 | OXYGEN SATURATION: 97 %

## 2024-03-18 DIAGNOSIS — R79.89 ELEVATED SERUM CREATININE: Primary | ICD-10-CM

## 2024-03-18 DIAGNOSIS — Z87.39 HISTORY OF GOUT: ICD-10-CM

## 2024-03-18 DIAGNOSIS — E11.22 TYPE 2 DIABETES MELLITUS WITH STAGE 3B CHRONIC KIDNEY DISEASE, WITHOUT LONG-TERM CURRENT USE OF INSULIN: ICD-10-CM

## 2024-03-18 DIAGNOSIS — I10 PRIMARY HYPERTENSION: ICD-10-CM

## 2024-03-18 DIAGNOSIS — N18.32 STAGE 3B CHRONIC KIDNEY DISEASE (CKD): ICD-10-CM

## 2024-03-18 DIAGNOSIS — N18.32 TYPE 2 DIABETES MELLITUS WITH STAGE 3B CHRONIC KIDNEY DISEASE, WITHOUT LONG-TERM CURRENT USE OF INSULIN: ICD-10-CM

## 2024-03-18 PROCEDURE — 99999 PR PBB SHADOW E&M-EST. PATIENT-LVL III: CPT | Mod: PBBFAC,,, | Performed by: INTERNAL MEDICINE

## 2024-03-18 PROCEDURE — 99205 OFFICE O/P NEW HI 60 MIN: CPT | Mod: S$PBB,,, | Performed by: INTERNAL MEDICINE

## 2024-03-18 PROCEDURE — 99213 OFFICE O/P EST LOW 20 MIN: CPT | Mod: PBBFAC,PN | Performed by: INTERNAL MEDICINE

## 2024-03-18 NOTE — PROGRESS NOTES
Subjective:       Patient ID: Ben Lerma is a 64 y.o. White male who presents for new patient evaluation for chronic renal failure.    Ben Lerma is referred by No, Primary Doctor to be evaluated for chronic renal failure.      Patient has not had routine follow-up with a physician until recently. Had lab work done two years at which time his kidney function was normal. He has developed gout and psoriatic arthritis for which he takes Otezla and prednisone. Uses indomethacin sparingly but denies regular NSAID use. Admits to THC use for joint pains. Has not started metformin for his diabetes but is taking lisinopril. Denies any urinary symptoms.    Rides motorcycles but has had issues due to his joint pains.    90yo father has CKD stage 4.    Review of Systems   Constitutional:  Negative for chills and fever.   HENT:  Negative for congestion.    Respiratory:  Negative for cough and shortness of breath.    Cardiovascular:  Negative for chest pain and leg swelling.   Gastrointestinal:  Negative for abdominal pain, diarrhea, nausea and vomiting.   Genitourinary:  Negative for difficulty urinating, dysuria and hematuria.   Musculoskeletal:  Positive for arthralgias and joint swelling. Negative for myalgias.   Neurological:  Negative for headaches.   Hematological:  Does not bruise/bleed easily.       The past medical, family and social histories were reviewed for this encounter.     Past Medical History:   Diagnosis Date    Allergy     Arthritis     Hypertension     Unspecified viral hepatitis C without hepatic coma      Past Surgical History:   Procedure Laterality Date    DENTAL SURGERY       Social History     Socioeconomic History    Marital status:    Tobacco Use    Smoking status: Former     Types: Cigarettes   Substance and Sexual Activity    Alcohol use: Not Currently    Drug use: Yes     Types: Marijuana     Current Outpatient Medications   Medication Sig    allopurinoL (ZYLOPRIM) 100 MG tablet  "Take 0.5 tablets (50 mg total) by mouth once daily.    cetirizine (ZYRTEC) 10 MG tablet Take 10 mg by mouth every morning.    indomethacin (INDOCIN) 50 MG capsule Take 1 capsule (50 mg total) by mouth 3 (three) times daily with meals.    lisinopriL 10 MG tablet Take 1 tablet (10 mg total) by mouth once daily.    OTEZLA 30 mg Tab Take 1 tablet (30 mg total) by mouth 2 (two) times daily.    predniSONE (DELTASONE) 5 MG tablet Take 1 tablet (5 mg total) by mouth once daily.    metFORMIN (GLUCOPHAGE-XR) 500 MG ER 24hr tablet Take 1 tablet (500 mg total) by mouth 2 (two) times daily with meals. (Patient not taking: Reported on 3/18/2024)     No current facility-administered medications for this visit.     /76 (BP Location: Left arm, Patient Position: Sitting, BP Method: Large (Manual))   Pulse 60   Ht 5' 10" (1.778 m)   Wt 112.5 kg (248 lb 0.3 oz)   SpO2 97%   BMI 35.59 kg/m²     Objective:      Physical Exam  Vitals reviewed.   Constitutional:       General: He is not in acute distress.     Appearance: He is well-developed.   HENT:      Head: Normocephalic and atraumatic.   Eyes:      General: No scleral icterus.     Conjunctiva/sclera: Conjunctivae normal.   Neck:      Vascular: No JVD.   Cardiovascular:      Rate and Rhythm: Normal rate and regular rhythm.      Heart sounds: Normal heart sounds. No murmur heard.     No friction rub. No gallop.   Pulmonary:      Effort: Pulmonary effort is normal. No respiratory distress.      Breath sounds: Normal breath sounds. No wheezing or rales.   Abdominal:      General: Bowel sounds are normal. There is no distension.      Palpations: Abdomen is soft.      Tenderness: There is no abdominal tenderness.   Musculoskeletal:         General: Swelling present.      Right lower leg: No edema.      Left lower leg: No edema.   Skin:     General: Skin is warm and dry.      Findings: No rash.      Comments: Multiple psoriatic plaques   Neurological:      Mental Status: He is " "alert and oriented to person, place, and time.         Assessment:       1. Elevated serum creatinine    2. Stage 3b chronic kidney disease (CKD)    3. Type 2 diabetes mellitus with stage 3b chronic kidney disease, without long-term current use of insulin    4. Primary hypertension    5. History of gout        Lab Results   Component Value Date    CREATININE 2.43 (H) 01/24/2024    BUN 28 (H) 01/24/2024     (L) 01/24/2024    K 4.9 01/24/2024     01/24/2024    CO2 22 01/24/2024     Lab Results   Component Value Date    CALCIUM 9.7 01/24/2024     Lab Results   Component Value Date    HCT 42.5 01/24/2024     No results found for: "UTPCR"    Plan:   Return to clinic in two weeks.  Labs for next visit include rfp, pth, ua, uacr, uric acid, renal US.  Baseline creatinine is 1.9-2.4 since 2023, previously 1.1 in 2022.  Need more updated lab work and urine studies. In the meantime hold indomethacin and metformin.  Blood pressure at goal. Continue lisinopril.  "

## 2024-03-25 ENCOUNTER — TELEPHONE (OUTPATIENT)
Dept: RHEUMATOLOGY | Facility: CLINIC | Age: 64
End: 2024-03-25
Payer: MEDICARE

## 2024-03-25 ENCOUNTER — HOSPITAL ENCOUNTER (OUTPATIENT)
Dept: RADIOLOGY | Facility: HOSPITAL | Age: 64
Discharge: HOME | End: 2024-03-25
Attending: INTERNAL MEDICINE
Payer: MEDICARE

## 2024-03-25 DIAGNOSIS — M1A.9XX0 CHRONIC GOUT WITHOUT TOPHUS, UNSPECIFIED CAUSE, UNSPECIFIED SITE: ICD-10-CM

## 2024-03-25 DIAGNOSIS — R79.89 ELEVATED SERUM CREATININE: ICD-10-CM

## 2024-03-25 PROCEDURE — 76770 US EXAM ABDO BACK WALL COMP: CPT | Mod: 26,,, | Performed by: RADIOLOGY

## 2024-03-25 PROCEDURE — 76770 US EXAM ABDO BACK WALL COMP: CPT | Mod: TC

## 2024-03-25 RX ORDER — ALLOPURINOL 100 MG/1
100 TABLET ORAL DAILY
Qty: 30 TABLET | Refills: 1 | Status: SHIPPED | OUTPATIENT
Start: 2024-03-25

## 2024-03-25 NOTE — TELEPHONE ENCOUNTER
Called and discussed lab results with patient per Dr. Neumann. Informed him of the new increased rx of allopurinol being sent to his pharmacy. Patient voiced understandings.

## 2024-03-25 NOTE — TELEPHONE ENCOUNTER
----- Message from Norma Mendoza MD sent at 3/25/2024  1:49 PM CDT -----  Please call the patient with the attached results. Uric acid still elevated.  Will increase allopurinol to 100 mg daily. I have sent the new prescription to his pharmacy

## 2024-04-03 ENCOUNTER — OFFICE VISIT (OUTPATIENT)
Dept: NEPHROLOGY | Facility: CLINIC | Age: 64
End: 2024-04-03
Payer: MEDICARE

## 2024-04-03 VITALS
HEIGHT: 70 IN | HEART RATE: 79 BPM | SYSTOLIC BLOOD PRESSURE: 138 MMHG | BODY MASS INDEX: 35.59 KG/M2 | OXYGEN SATURATION: 98 % | DIASTOLIC BLOOD PRESSURE: 82 MMHG

## 2024-04-03 DIAGNOSIS — N18.32 TYPE 2 DIABETES MELLITUS WITH STAGE 3B CHRONIC KIDNEY DISEASE, WITHOUT LONG-TERM CURRENT USE OF INSULIN: ICD-10-CM

## 2024-04-03 DIAGNOSIS — L40.50 PSORIATIC ARTHRITIS: ICD-10-CM

## 2024-04-03 DIAGNOSIS — E11.22 TYPE 2 DIABETES MELLITUS WITH STAGE 3B CHRONIC KIDNEY DISEASE, WITHOUT LONG-TERM CURRENT USE OF INSULIN: ICD-10-CM

## 2024-04-03 DIAGNOSIS — N18.32 STAGE 3B CHRONIC KIDNEY DISEASE (CKD): Primary | ICD-10-CM

## 2024-04-03 DIAGNOSIS — I10 PRIMARY HYPERTENSION: ICD-10-CM

## 2024-04-03 DIAGNOSIS — Z87.39 HISTORY OF GOUT: ICD-10-CM

## 2024-04-03 PROCEDURE — 99999 PR PBB SHADOW E&M-EST. PATIENT-LVL III: CPT | Mod: PBBFAC,,, | Performed by: INTERNAL MEDICINE

## 2024-04-03 PROCEDURE — 99214 OFFICE O/P EST MOD 30 MIN: CPT | Mod: S$PBB,,, | Performed by: INTERNAL MEDICINE

## 2024-04-03 PROCEDURE — 99213 OFFICE O/P EST LOW 20 MIN: CPT | Mod: PBBFAC,PN | Performed by: INTERNAL MEDICINE

## 2024-04-03 NOTE — PROGRESS NOTES
"Subjective:        Patient ID: Ben Lerma is a 64 y.o. White male who presents for return patient evaluation for chronic renal failure.    Patient denies any issues since I last saw him. He has held his indomethacin and metformin. Reports of his arthritis being the "same". Denies any recent gout flares. Is scheduled to see hepatology later this month.    Review of Systems   Constitutional:  Negative for chills, diaphoresis and fever.   Respiratory:  Positive for cough. Negative for shortness of breath.    Cardiovascular:  Negative for chest pain and leg swelling.   Gastrointestinal:  Negative for abdominal pain, diarrhea, nausea and vomiting.   Genitourinary:  Negative for difficulty urinating, dysuria and hematuria.   Musculoskeletal:  Positive for arthralgias. Negative for myalgias.   Neurological:  Negative for headaches.   Hematological:  Does not bruise/bleed easily.         The past medical, family and social histories were reviewed for this encounter.     /82 (BP Location: Right arm, Patient Position: Sitting, BP Method: Large (Manual))   Pulse 79   Ht 5' 10" (1.778 m)   SpO2 98%   BMI 35.59 kg/m²     Objective:      Physical Exam  Vitals reviewed.   Constitutional:       General: He is not in acute distress.     Appearance: He is well-developed.   HENT:      Head: Normocephalic and atraumatic.   Eyes:      General: No scleral icterus.     Conjunctiva/sclera: Conjunctivae normal.   Neck:      Vascular: No JVD.   Cardiovascular:      Rate and Rhythm: Normal rate and regular rhythm.      Heart sounds: Normal heart sounds. No murmur heard.     No friction rub. No gallop.   Pulmonary:      Effort: Pulmonary effort is normal. No respiratory distress.      Breath sounds: Normal breath sounds. No wheezing.   Abdominal:      General: Bowel sounds are normal. There is no distension.      Palpations: Abdomen is soft.      Tenderness: There is no abdominal tenderness.   Musculoskeletal:      Right lower " leg: No edema.      Left lower leg: No edema.   Skin:     General: Skin is warm and dry.      Findings: No rash.   Neurological:      Mental Status: He is alert and oriented to person, place, and time.         Assessment:       1. Stage 3b chronic kidney disease (CKD)    2. Primary hypertension    3. Type 2 diabetes mellitus with stage 3b chronic kidney disease, without long-term current use of insulin    4. Psoriatic arthritis    5. History of gout          Lab Results   Component Value Date    CREATININE 1.9 (H) 03/25/2024    BUN 34 (H) 03/25/2024     03/25/2024    K 4.4 03/25/2024     03/25/2024    CO2 20 (L) 03/25/2024     Lab Results   Component Value Date    .4 (H) 03/25/2024    CALCIUM 9.8 03/25/2024    PHOS 3.3 03/25/2024     Lab Results   Component Value Date    HCT 42.5 01/24/2024     Prot/Creat Ratio, Urine   Date Value Ref Range Status   03/25/2024 Unable to calculate 0.00 - 0.20 Final      Plan:   Return to clinic in 6 months.  Labs for next visit include rfp, upcr, pth.  Baseline creatinine is 1.9-2.4 since 2023, previously 1.1 in 2022.  Renal US: Right 11.6 and left 10.8, bilateral stones, mild left hydronephrosis. Discussed stones with patient, currently asymptomatic and would prefer to hold off on urology referral.  Blood pressure at goal. Continue lisinopril.  A1c 5.5, will discontinue metformin  Has follow-up with rheumatology in regards to arthritis and gout, would prefer to avoid NSAID's if possible.    KFRE 2-Year: 0.9% at 3/25/2024  8:56 AM  Calculated from:  Serum Creatinine: 1.9 mg/dL at 3/25/2024  8:54 AM  Urine Albumin Creatinine Ratio: 18.9 ug/mg at 3/25/2024  8:56 AM  Age: 64 years  Sex: Male at 3/25/2024  8:56 AM  Has CKD-3 to CKD-5: Yes    KFRE 5-Year: 2.7% at 3/25/2024  8:56 AM  Calculated from:  Serum Creatinine: 1.9 mg/dL at 3/25/2024  8:54 AM  Urine Albumin Creatinine Ratio: 18.9 ug/mg at 3/25/2024  8:56 AM  Age: 64 years  Sex: Male at 3/25/2024  8:56 AM  Has  CKD-3 to CKD-5: Yes

## 2024-04-25 ENCOUNTER — OFFICE VISIT (OUTPATIENT)
Dept: HEPATOLOGY | Facility: CLINIC | Age: 64
End: 2024-04-25
Payer: MEDICARE

## 2024-04-25 ENCOUNTER — PATIENT MESSAGE (OUTPATIENT)
Dept: RHEUMATOLOGY | Facility: CLINIC | Age: 64
End: 2024-04-25
Payer: MEDICARE

## 2024-04-25 VITALS — WEIGHT: 251 LBS | BODY MASS INDEX: 35.93 KG/M2 | HEIGHT: 70 IN

## 2024-04-25 DIAGNOSIS — D84.821 IMMUNOSUPPRESSION DUE TO DRUG THERAPY: ICD-10-CM

## 2024-04-25 DIAGNOSIS — Z79.899 IMMUNOSUPPRESSION DUE TO DRUG THERAPY: ICD-10-CM

## 2024-04-25 DIAGNOSIS — R76.8 HEPATITIS C ANTIBODY TEST POSITIVE: ICD-10-CM

## 2024-04-25 DIAGNOSIS — L40.50 PSORIATIC ARTHRITIS: ICD-10-CM

## 2024-04-25 DIAGNOSIS — R76.8 HEPATITIS B CORE ANTIBODY POSITIVE: Primary | ICD-10-CM

## 2024-04-25 DIAGNOSIS — R74.8 ELEVATED LIVER ENZYMES: ICD-10-CM

## 2024-04-25 PROCEDURE — 99214 OFFICE O/P EST MOD 30 MIN: CPT | Mod: PBBFAC,PO | Performed by: NURSE PRACTITIONER

## 2024-04-25 PROCEDURE — 99999 PR PBB SHADOW E&M-EST. PATIENT-LVL IV: CPT | Mod: PBBFAC,,, | Performed by: NURSE PRACTITIONER

## 2024-04-25 PROCEDURE — 99215 OFFICE O/P EST HI 40 MIN: CPT | Mod: S$PBB,,, | Performed by: NURSE PRACTITIONER

## 2024-04-25 NOTE — PROGRESS NOTES
[facility] - New Patient     REFERRING PROVIDER: [facility]  PCP: No, Primary Doctor    Chief Complaint: Hepatitis B core Ab+      HISTORY     This is a 64 y.o. male referred for evaluation of positive HBV core Ab.     Labs from 23 showed a positive hepatitis B core antibody. HBV surface Ag negative and DNA undetected, indicating no current infection.    HCV Ab also noted to be positive though RNA negative, indicating no current infection.     No known history of hepatitis or prior exposure.  No family history of hepatitis B.  He has a h/o drug use though >40 years ago. Has multiple tattoos that are many years old. He had 1 friend that had hepatitis and  from liver failure.     History of IVIG - no    Limited labs for review though liver enzymes were noted to be mildly elevated last year, 40-60s. TBili mildly elevated x 1, 1.4. Liver function otherwise normal.     Currently followed by Rheumatology for psoriasis.  Medication regimen - on otezla and prednisone 5 mg daily. Per notes, considering cosentyx.     He has risk factors for fatty liver-  Body mass index is 36.01 kg/m².  HTN, HLD, DM (last HgbA1c 5.5)  Alcohol use- none    Feels well overall.  Denies symptoms of hepatic decompensation including jaundice, ascites, cognitive problems to suggest hepatic encephalopathy, or GI bleeding.           Past medical history, surgical history, problem list, family history, social history, allergies: Reviewed and updated in the appropriate section of the electronic medical record.      Current Outpatient Medications   Medication Sig Dispense Refill    cetirizine (ZYRTEC) 10 MG tablet Take 10 mg by mouth every morning.      lisinopriL 10 MG tablet Take 1 tablet (10 mg total) by mouth once daily. 30 tablet 3    OTEZLA 30 mg Tab Take 1 tablet (30 mg total) by mouth 2 (two) times daily. 60 tablet 2    predniSONE (DELTASONE) 5 MG tablet Take 1 tablet (5 mg total) by mouth once daily. 60  tablet 2    allopurinoL (ZYLOPRIM) 100 MG tablet Take 1 tablet (100 mg total) by mouth once daily. (Patient not taking: Reported on 4/25/2024) 30 tablet 1    tenofovir alafenamide (VEMLIDY) 25 mg Tab Take 1 tablet (25 mg total) by mouth once daily. 30 tablet 11     No current facility-administered medications for this visit.     Medication list reviewed and updated.      Review of Systems - as per HPI  Constitutional: Negative for unexpected weight change.   Respiratory: Negative for shortness of breath.    Cardiovascular: Negative for leg swelling.  Gastrointestinal: Negative for abdominal distention or abdominal pain. Negative for melena or hematemesis.  Musculoskeletal: Negative for myalgias.    Skin: Negative for jaundice or itching.  Neurological: Negative for confusion or slowed mentation. Negative for tremors.   Hematological: Does not bruise/bleed easily.       Physical Exam   Constitutional: No distress. Alert and oriented.  Eyes: No scleral icterus.   Pulmonary/Chest: Respiratory effort normal. No respiratory distress.   Abdominal: No distension, no ascites appreciated.   Extremities: No edema.   Neurological: No tremor.   Skin: No jaundice.   Psychiatric: Normal mood and affect. Speech, behavior, and thought content normal.       LABS & DIAGNOSTIC STUDIES     I have personally reviewed pertinent laboratory findings:    Lab Results   Component Value Date    ALT 64 (H) 01/24/2024    AST 42 01/24/2024    ALKPHOS 120 01/24/2024    BILITOT 1.4 (H) 01/24/2024    ALBUMIN 3.8 03/25/2024       Lab Results   Component Value Date    WBC 21.97 (H) 01/24/2024    HGB 14.3 01/24/2024    HCT 42.5 01/24/2024    MCV 93 01/24/2024     01/24/2024       Lab Results   Component Value Date     03/25/2024    K 4.4 03/25/2024    BUN 34 (H) 03/25/2024    CREATININE 1.9 (H) 03/25/2024       Lab Results   Component Value Date    HEPBSAG Non-reactive 12/27/2023    HEPBCAB Reactive (A) 12/27/2023    HEPCAB Reactive (A)  "12/27/2023       No results found for: "AFP"      I have personally reviewed the following result reports:  No abdominal imaging for review       ASSESSMENT & PLAN     64 y.o. male with:    1. Hepatitis B core antibody positive    2. Psoriatic arthritis    3. Elevated liver enzymes    4. Hepatitis C antibody test positive    5. Immunosuppression due to drug therapy        Hepatitis C Ab positive though RNA negative, indicating that he does not have hepatitis C- reassured.     Hepatitis B serologies reviewed with patient. Reassured that patient does not have active or chronic hepatitis B infection. Labs suggest prior exposure and clearance. Discussed risk of HBV reactivation with certain immunosuppressive medications and when antiviral prophylaxis is warranted.    Cosentyx (secukinumab) is moderate risk for HBV reactivation (1-10%). Reviewed options including hepatitis B prophylaxis vs close lab monitoring. Patient elects for prophylaxis.     Recommendations:  Plan to start Vemlidy for prophylaxis if he starts Cosentyx. Rx sent to [facility].   Once he has started above meds, recommend labs to monitor LFTs, hep B surface Ag, and HBV DNA every 3-6 months  Repeat HCV RNA once more to have 2 negatives documented. Otherwise, nothing further needed for +HCV Ab.  Discussed that mildly elevated liver enzymes may be due to fatty liver as he has risk factors (obesity, HTN, HLD, ?h/o DM). Obtain baseline US and likely plan for Fibroscan for fibrosis/steatosis staging.       Orders Placed This Encounter   Procedures    US Abdomen Complete    Hepatitis B Surface Antigen    HEPATITIS B VIRAL DNA, QUANTITATIVE    Hepatitis C RNA, Quantitative, PCR    Ferritin    Iron and TIBC    Comprehensive Metabolic Panel       Return to clinic in 6 months.       Thank you for allowing me to participate in the care of Ben Cuevas FNP-C  Hepatology        Duration of encounter: 45 min  This includes face to face time and " non-face to face time preparing to see the patient (eg, review of tests), obtaining and/or reviewing separately obtained history, documenting clinical information in the electronic or other health record, independently interpreting results and communicating results to the patient/family/caregiver, or Care coordination.

## 2024-04-25 NOTE — PATIENT INSTRUCTIONS
If you start cosentyx, we can start a medication to prevent hepatitis B from returning. This medication will come from [facility] and you can start this at the same time as your cosentyx.    Will continue to monitor liver labs and get an ultrasound to look for fatty liver

## 2024-04-29 RX ORDER — TENOFOVIR ALAFENAMIDE 25 MG/1
25 TABLET ORAL DAILY
Qty: 30 TABLET | Refills: 11 | OUTPATIENT
Start: 2024-04-29 | End: 2024-05-03 | Stop reason: SDUPTHER

## 2024-04-30 ENCOUNTER — HOSPITAL ENCOUNTER (OUTPATIENT)
Dept: RADIOLOGY | Facility: HOSPITAL | Age: 64
Discharge: HOME | End: 2024-04-30
Attending: NURSE PRACTITIONER
Payer: MEDICARE

## 2024-04-30 DIAGNOSIS — R74.8 ELEVATED LIVER ENZYMES: ICD-10-CM

## 2024-04-30 PROCEDURE — 76700 US EXAM ABDOM COMPLETE: CPT | Mod: 26,,, | Performed by: RADIOLOGY

## 2024-04-30 PROCEDURE — 76700 US EXAM ABDOM COMPLETE: CPT | Mod: TC,PO

## 2024-05-03 ENCOUNTER — TELEPHONE (OUTPATIENT)
Dept: HEPATOLOGY | Facility: CLINIC | Age: 64
End: 2024-05-03
Payer: MEDICARE

## 2024-05-03 ENCOUNTER — OFFICE VISIT (OUTPATIENT)
Dept: RHEUMATOLOGY | Facility: CLINIC | Age: 64
End: 2024-05-03
Payer: MEDICARE

## 2024-05-03 VITALS
WEIGHT: 250.25 LBS | HEART RATE: 89 BPM | SYSTOLIC BLOOD PRESSURE: 125 MMHG | HEIGHT: 70 IN | BODY MASS INDEX: 35.83 KG/M2 | DIASTOLIC BLOOD PRESSURE: 79 MMHG

## 2024-05-03 DIAGNOSIS — L40.50 PSORIATIC ARTHRITIS: ICD-10-CM

## 2024-05-03 DIAGNOSIS — Z71.89 COUNSELING AND COORDINATION OF CARE: ICD-10-CM

## 2024-05-03 DIAGNOSIS — M1A.9XX0 CHRONIC GOUT WITHOUT TOPHUS, UNSPECIFIED CAUSE, UNSPECIFIED SITE: ICD-10-CM

## 2024-05-03 DIAGNOSIS — E11.9 TYPE 2 DIABETES MELLITUS WITHOUT COMPLICATION, WITHOUT LONG-TERM CURRENT USE OF INSULIN: ICD-10-CM

## 2024-05-03 DIAGNOSIS — L40.9 PSORIASIS: Primary | ICD-10-CM

## 2024-05-03 DIAGNOSIS — R76.8 HEPATITIS B CORE ANTIBODY POSITIVE: ICD-10-CM

## 2024-05-03 DIAGNOSIS — Z79.899 IMMUNOSUPPRESSION DUE TO DRUG THERAPY: ICD-10-CM

## 2024-05-03 DIAGNOSIS — N18.32 STAGE 3B CHRONIC KIDNEY DISEASE (CKD): ICD-10-CM

## 2024-05-03 DIAGNOSIS — R76.8 HEPATITIS C ANTIBODY TEST POSITIVE: ICD-10-CM

## 2024-05-03 DIAGNOSIS — D84.821 IMMUNOSUPPRESSION DUE TO DRUG THERAPY: ICD-10-CM

## 2024-05-03 PROCEDURE — 99215 OFFICE O/P EST HI 40 MIN: CPT | Mod: S$PBB,,, | Performed by: STUDENT IN AN ORGANIZED HEALTH CARE EDUCATION/TRAINING PROGRAM

## 2024-05-03 PROCEDURE — 99999 PR PBB SHADOW E&M-EST. PATIENT-LVL III: CPT | Mod: PBBFAC,,, | Performed by: STUDENT IN AN ORGANIZED HEALTH CARE EDUCATION/TRAINING PROGRAM

## 2024-05-03 PROCEDURE — 99213 OFFICE O/P EST LOW 20 MIN: CPT | Mod: PBBFAC,PN | Performed by: STUDENT IN AN ORGANIZED HEALTH CARE EDUCATION/TRAINING PROGRAM

## 2024-05-03 RX ORDER — CLOBETASOL PROPIONATE 0.5 MG/G
OINTMENT TOPICAL 2 TIMES DAILY
Qty: 45 G | Refills: 3 | Status: SHIPPED | OUTPATIENT
Start: 2024-05-03

## 2024-05-03 RX ORDER — TENOFOVIR ALAFENAMIDE 25 MG/1
25 TABLET ORAL DAILY
Qty: 30 TABLET | Refills: 11 | Status: ACTIVE | OUTPATIENT
Start: 2024-05-03

## 2024-05-03 RX ORDER — TENOFOVIR ALAFENAMIDE 25 MG/1
25 TABLET ORAL DAILY
Qty: 30 TABLET | Refills: 11 | Status: SHIPPED | OUTPATIENT
Start: 2024-05-03 | End: 2024-05-03 | Stop reason: SDUPTHER

## 2024-05-03 RX ORDER — RISANKIZUMAB-RZAA 150 MG/ML
INJECTION SUBCUTANEOUS
Qty: 4 ML | Refills: 3 | Status: ACTIVE | OUTPATIENT
Start: 2024-05-03

## 2024-05-03 NOTE — PROGRESS NOTES
RHEUMATOLOGY OUTPATIENT CLINIC NOTE    5/3/2024    Attending Rheumatologist: Norma Mendoza  Primary Care Provider: No, Primary Doctor   Physician Requesting Consultation: No referring provider defined for this encounter.  Chief Complaint/Reason For Consultation:  Psoriatic Arthritis      Subjective:       HPI  Ben Lerma is a 64 y.o. White male with hypertension, diabetes who comes for evaluation of psoriatic arthritis     Interim history  -Last visit on 2/2024  -Saw hepatology who recommended tenofovir for Hep B prophylaxis to start at the same time as biologic.   -Current meds: Otezla 30 mg BID  -he follows with nephrology due to CKD.   -Labs from 3/25 showed SUA of 9.7. nephrology increased allopurinol to 100 mg daily   -Psoriasis is very active. Plaques in bilateral legs have been bleeding due to pruritus. Still has plaques around right ear, bilateral elbows and upper back.   -Joint pain has been stable but noted pain in bilateral 2nd DIPs, pain and swelling in b/l wrists.   -he has not started allopurinol yet. Has had gout flares too   -continue taking prednisone 5 mg daily     Disease history    Diagnosis of PsO about 3-4 years ago, involving knees, legs, elbows, low back, scalp. Started on Otezla about a year ago but has not taken it consistently due to some diarrhea and upset stomach. He takes it at least 3 times per week. Reports that sometimes diarrhea is not present when he takes it.   He has a history of positive hep C antibody with negative VL and positive hep B core antibody. Because of this, no other treatment options have been explored yet. He has not seen hepatologist or ID.   He has history of gout for many years, affecting both feet and knees. Has taken indocin and colchicine in the past. Indocin caused mood changes and colchicine gave him upset stomach. Took allopurinol for a while many months ago. Last flare a couple of months ago.   Reports new onset bilateral wrist pain  and swelling, also noted pain in multiple knuckles. Reports occasional pain in low back, no buttock pain. Morning stiffness of about 5 min.     He quit smoking cigarettes a while ago. Now only smokes marihuana. She does not drink alcohol. He is retired, used to work offshore. He rides a motocycle and has pain in hands with riding.     Pertinent labs and imaging  10/2022  Hep C antibody reactive  Hep C viral load negative  Hepatitis B core antibody reactive  Hepatitis B e Ab, surface antibody negative  Quantiferon negative  KALEY 1:160 homogeneous   Uric acid 10.7    12/2023  RF, CCP negative  ESR and CRP elevated  SUA 9.4    Chronic comorbid conditions affecting medical decision making today:  Past Medical History:   Diagnosis Date    Allergy     Arthritis     Hypertension      Past Surgical History:   Procedure Laterality Date    DENTAL SURGERY       No family history on file.  Social History     Substance and Sexual Activity   Alcohol Use Not Currently     Social History     Tobacco Use   Smoking Status Former    Types: Cigarettes   Smokeless Tobacco Not on file     Social History     Substance and Sexual Activity   Drug Use Yes    Types: Marijuana       Current Outpatient Medications:     cetirizine (ZYRTEC) 10 MG tablet, Take 10 mg by mouth every morning., Disp: , Rfl:     lisinopriL 10 MG tablet, Take 1 tablet (10 mg total) by mouth once daily., Disp: 30 tablet, Rfl: 3    OTEZLA 30 mg Tab, Take 1 tablet (30 mg total) by mouth 2 (two) times daily., Disp: 60 tablet, Rfl: 2    predniSONE (DELTASONE) 5 MG tablet, Take 1 tablet (5 mg total) by mouth once daily., Disp: 60 tablet, Rfl: 2    allopurinoL (ZYLOPRIM) 100 MG tablet, Take 1 tablet (100 mg total) by mouth once daily. (Patient not taking: Reported on 4/25/2024), Disp: 30 tablet, Rfl: 1    clobetasol 0.05% (TEMOVATE) 0.05 % Oint, Apply topically 2 (two) times daily., Disp: 45 g, Rfl: 3    risankizumab-rzaa (SKYRIZI) 150 mg/mL PnIj, 150 mg at weeks 0, 4, and then  every 12 weeks thereafter., Disp: 4 mL, Rfl: 3    tenofovir alafenamide (VEMLIDY) 25 mg Tab, Take 1 tablet (25 mg total) by mouth once daily., Disp: 30 tablet, Rfl: 11     Objective:         Vitals:    05/03/24 1412   BP: 125/79   Pulse: 89     Physical Exam   Constitutional: He is oriented to person, place, and time. He appears well-developed.   HENT:   Head: Normocephalic.   Mouth/Throat: Mucous membranes are moist.   Pulmonary/Chest: Effort normal.   Abdominal: Soft.   Musculoskeletal:      Cervical back: Neck supple.      Comments: Cspine FROM no tenderness  Tspine FROM no tenderness  Lspine FROM no tenderness.  Shoulders: FROM; no synovitis;  Elbows: FROM; no synovitis; no tophi or nodules  Wrists: tenderness in bilateral wrists   MCPs: FROM; no tenderness or synovitis    PIPs:FROM; no synovitis   DIPs: FROM; no synovitis; tenderness bilateral 2nd DIP  HIPS: FROM  Knees: FROM; no synovitis; no instability  Ankles: FROM: no synovitis   Toes: no tenderness on palpation.     Lymphadenopathy:     He has no cervical adenopathy.   Neurological: He is alert and oriented to person, place, and time.   Skin: No rash noted.   Multiple areas of psoriatic plaques that have improved mildly but still present. Less scaly lesions    Vitals reviewed.       Media Information from 12/2023                        5/3    RIGHT LEG       Media Information      LEFT LEG       Media Information        Reviewed old and all outside pertinent medical records available.    All lab results personally reviewed and interpreted by me.  Lab Results   Component Value Date    WBC 21.97 (H) 01/24/2024    HGB 14.3 01/24/2024    HCT 42.5 01/24/2024    MCV 93 01/24/2024    MCH 31.2 (H) 01/24/2024    MCHC 33.6 01/24/2024    RDW 14.0 01/24/2024     01/24/2024    MPV 10.8 01/24/2024    NEUTROABS 4.1 10/12/2022    LYMPHOPCT 39 10/12/2022       Lab Results   Component Value Date     03/25/2024    K 4.4 03/25/2024     03/25/2024    CO2 20  "(L) 03/25/2024     03/25/2024    BUN 34 (H) 03/25/2024    CALCIUM 9.8 03/25/2024    PROT 8.9 (H) 01/24/2024    ALBUMIN 3.8 03/25/2024    BILITOT 1.4 (H) 01/24/2024    AST 42 01/24/2024    ALKPHOS 120 01/24/2024    ALT 64 (H) 01/24/2024       Lab Results   Component Value Date    COLORU Yellow 03/25/2024    APPEARANCEUA Clear 03/25/2024    SPECGRAV 1.010 03/25/2024    PHUR 6.0 03/25/2024    PROTEINUA Negative 03/25/2024    KETONESU Negative 03/25/2024    LEUKOCYTESUR 1+ (A) 03/25/2024    NITRITE Negative 03/25/2024    UROBILINOGEN Negative 03/25/2024       Lab Results   Component Value Date    CRP 10.9 (H) 12/27/2023       Lab Results   Component Value Date    RF <13.0 12/27/2023    SEDRATE 54 (H) 12/27/2023    CCPANTIBODIE <0.5 12/27/2023       No components found for: "25OHVITDTOT", "41SAGIDM2", "60AIVTMI2", "METHODNOTE"    Lab Results   Component Value Date    URICACID 9.7 (H) 03/25/2024       Lab Results   Component Value Date    HEPBSAB 40.17 12/27/2023    HEPBSAB Reactive 12/27/2023    HEPBSAG Non-reactive 12/27/2023    HEPBSAG Negative 10/12/2022    HEPCAB Reactive (A) 12/27/2023       Imaging:  All imaging reviewed and independently interpreted by me.         ASSESSMENT / PLAN:     Ben Lerma is a 64 y.o. White male with  hypertension, diabetes who comes for evaluation of psoriatic arthritis     1. Severe Psoriasis   -Extensive body surface involvement. PASI score of 11.9  -On otezla 30 mg BID and still has severe disease  -start Skyrizi 150 mg SC on week 0, 4 and then every 12 weeks. Favor IL-23 or Il-17 rather than TNF due to positive hepatitis B core Ab. Avoid MTX due to CKD    2. Psoriatic arthritis  -Moderate disease activity  -See above for skyrizi.   -continue prednisone 5 mg daily     3. History of gout   -SUA 10.7 in 2022 -> 9.4 .> 9.6   -has had many attacks in feet and knees.   -resume allopurinol 100 mg daily   -labs every 4 weeks  -discussed about gout diet    4. Hepatitis C antibody " test positive  -VL in 10/2022 negative  -follows with hepatology  -was reassured     5. Hepatitis B core antibody positive  -Test was positive in 10/2022. Negative HepB surface Antigen  -Will start prophylaxis with tenofovir at the same time as skyrizi.     6. Hypertension and diabetes  -advised him to establish care with PCP. Sent referral     7. CKD stage 3b   -follows with nephrology  -avoid nephrotoxics     8. Other specified counseling  - over 10 minutes spent regarding below topics:  - Immunization counseling done.  - Weight loss counseling done.  - Nutrition and exercise counseling.  - Limitation of alcohol consumption.  - Regular exercise:  Aerobic and resistance.  - Medication counseling provided.      Follow up in about 2 months (around 7/3/2024).    Method of contact with patient concerns: [facility]    Disclaimer:  This note is prepared using voice recognition software and as such is likely to have errors and has not been proof read. Please contact me for questions.     Time spent: 40 minutes in face to face discussion concerning diagnosis, prognosis, review of lab and test results, benefits of treatment as well as management of disease, counseling of patient and coordination of care between various health care providers.  Greater than half the time spent was used for coordination of care and counseling of patient.    Norma Mendoza M.D.  Rheumatology  [facility]

## 2024-05-21 ENCOUNTER — PATIENT MESSAGE (OUTPATIENT)
Dept: RHEUMATOLOGY | Facility: CLINIC | Age: 64
End: 2024-05-21
Payer: MEDICARE

## 2024-05-21 DIAGNOSIS — M1A.9XX0 CHRONIC GOUT WITHOUT TOPHUS, UNSPECIFIED CAUSE, UNSPECIFIED SITE: Primary | ICD-10-CM

## 2024-05-27 ENCOUNTER — TELEPHONE (OUTPATIENT)
Dept: RHEUMATOLOGY | Facility: CLINIC | Age: 64
End: 2024-05-27
Payer: MEDICARE

## 2024-05-27 NOTE — TELEPHONE ENCOUNTER
Informed patient to get uric acid drawn within 3 weeks or so to decide if we need to increase allopurinol. Patient voiced understandings.

## 2024-07-08 ENCOUNTER — TELEPHONE (OUTPATIENT)
Dept: HEPATOLOGY | Facility: CLINIC | Age: 64
End: 2024-07-08
Payer: MEDICARE

## 2024-07-08 ENCOUNTER — OFFICE VISIT (OUTPATIENT)
Dept: RHEUMATOLOGY | Facility: CLINIC | Age: 64
End: 2024-07-08
Payer: MEDICARE

## 2024-07-08 VITALS
HEIGHT: 70 IN | WEIGHT: 245.56 LBS | SYSTOLIC BLOOD PRESSURE: 131 MMHG | BODY MASS INDEX: 35.15 KG/M2 | DIASTOLIC BLOOD PRESSURE: 86 MMHG | HEART RATE: 80 BPM

## 2024-07-08 DIAGNOSIS — L40.50 PSORIATIC ARTHRITIS: ICD-10-CM

## 2024-07-08 DIAGNOSIS — N18.32 STAGE 3B CHRONIC KIDNEY DISEASE (CKD): ICD-10-CM

## 2024-07-08 DIAGNOSIS — M1A.9XX0 CHRONIC GOUT WITHOUT TOPHUS, UNSPECIFIED CAUSE, UNSPECIFIED SITE: ICD-10-CM

## 2024-07-08 DIAGNOSIS — Z79.899 DRUG-INDUCED IMMUNODEFICIENCY: ICD-10-CM

## 2024-07-08 DIAGNOSIS — Z71.89 COUNSELING AND COORDINATION OF CARE: ICD-10-CM

## 2024-07-08 DIAGNOSIS — R76.8 HEPATITIS C ANTIBODY TEST POSITIVE: ICD-10-CM

## 2024-07-08 DIAGNOSIS — D84.821 DRUG-INDUCED IMMUNODEFICIENCY: ICD-10-CM

## 2024-07-08 DIAGNOSIS — I10 HYPERTENSION, UNSPECIFIED TYPE: ICD-10-CM

## 2024-07-08 DIAGNOSIS — L40.9 PSORIASIS: Primary | ICD-10-CM

## 2024-07-08 DIAGNOSIS — R76.8 HEPATITIS B CORE ANTIBODY POSITIVE: ICD-10-CM

## 2024-07-08 DIAGNOSIS — K76.0 FATTY LIVER: Primary | ICD-10-CM

## 2024-07-08 DIAGNOSIS — E66.01 OBESITY, MORBID: ICD-10-CM

## 2024-07-08 PROCEDURE — 99999 PR PBB SHADOW E&M-EST. PATIENT-LVL III: CPT | Mod: PBBFAC,,, | Performed by: STUDENT IN AN ORGANIZED HEALTH CARE EDUCATION/TRAINING PROGRAM

## 2024-07-08 PROCEDURE — 99214 OFFICE O/P EST MOD 30 MIN: CPT | Mod: S$PBB,,, | Performed by: STUDENT IN AN ORGANIZED HEALTH CARE EDUCATION/TRAINING PROGRAM

## 2024-07-08 PROCEDURE — 99213 OFFICE O/P EST LOW 20 MIN: CPT | Mod: PBBFAC,PN | Performed by: STUDENT IN AN ORGANIZED HEALTH CARE EDUCATION/TRAINING PROGRAM

## 2024-07-08 RX ORDER — PREDNISONE 5 MG/1
5 TABLET ORAL DAILY
Qty: 60 TABLET | Refills: 2 | Status: SHIPPED | OUTPATIENT
Start: 2024-07-08 | End: 2025-01-04

## 2024-07-08 RX ORDER — COLCHICINE 0.6 MG/1
TABLET ORAL
COMMUNITY
Start: 2024-06-18 | End: 2024-07-08

## 2024-07-08 NOTE — PATIENT INSTRUCTIONS
Start taking allopurinol 100 mg daily   Stop otezla  Continue skyrizi injections  Do labs now and in 4 weeks.

## 2024-07-08 NOTE — TELEPHONE ENCOUNTER
Pt's fibroscan was schld. Ok to override per provider.    [place]     ----- Message from Janeth Cuevas NP sent at 7/8/2024  3:17 PM CDT -----  Please add Fibroscan same day as visit in Sept, ok to override. Thanks.

## 2024-07-08 NOTE — PROGRESS NOTES
RHEUMATOLOGY OUTPATIENT CLINIC NOTE    7/8/2024    Attending Rheumatologist: Norma Mendoza  Primary Care Provider: No, Primary Doctor   Physician Requesting Consultation: No referring provider defined for this encounter.  Chief Complaint/Reason For Consultation:  Psoriasis      Subjective:       HPI  Ben Lerma is a 64 y.o. White male with hypertension, diabetes who comes for evaluation of psoriatic arthritis     Interim history  -last seen in 4/2024  -Current meds: otezla 30 mg BID and Skyrizi SC  -His psoriasis has improved significantly. It is essentially resolved and now has just hyperpigmentation.   -he never started allopurinol after last visit.   -he reports that he is still having intermittent bilateral wrist pain at times, noted swelling as well. When this happens, he takes prednisone 10 mg for a couple of days. In mid June, he went to the ED and was treated as presumed gout flare but his symptoms were not controlled with colchicine. He also reports significant GI upset with colchicine.   -he forgot to do the labs  -follows with nephro for CKD and hepatology due to hep B core Ab positive on tenofovir.   -he developed acute pain in left wrist in mid June, went to the ED and was treated as gout flare   -no recent labs   -takes prednisone 10 mg as needed when he feels that a gout flare. Once in a month and a half   -colchicine caused GI upset.     Disease history    Diagnosis of PsO about 3-4 years ago, involving knees, legs, elbows, low back, scalp. Started on Otezla about a year ago but has not taken it consistently due to some diarrhea and upset stomach. He takes it at least 3 times per week. Reports that sometimes diarrhea is not present when he takes it.   He has a history of positive hep C antibody with negative VL and positive hep B core antibody. Because of this, no other treatment options have been explored yet. He has not seen hepatologist or ID.   He has history of gout for many  years, affecting both feet and knees. Has taken indocin and colchicine in the past. Indocin caused mood changes and colchicine gave him upset stomach. Took allopurinol for a while many months ago. Last flare a couple of months ago.   Reports new onset bilateral wrist pain and swelling, also noted pain in multiple knuckles. Reports occasional pain in low back, no buttock pain. Morning stiffness of about 5 min.     He quit smoking cigarettes a while ago. Now only smokes marihuana. She does not drink alcohol. He is retired, used to work offshore. He rides a motocycle and has pain in hands with riding.     Pertinent labs and imaging  10/2022  Hep C antibody reactive  Hep C viral load negative  Hepatitis B core antibody reactive  Hepatitis B e Ab, surface antibody negative  Quantiferon negative  KALEY 1:160 homogeneous   Uric acid 10.7    12/2023  RF, CCP negative  ESR and CRP elevated  SUA 9.4    Chronic comorbid conditions affecting medical decision making today:  Past Medical History:   Diagnosis Date    Allergy     Arthritis     Hypertension      Past Surgical History:   Procedure Laterality Date    DENTAL SURGERY       No family history on file.  Social History     Substance and Sexual Activity   Alcohol Use Not Currently    Comment: very rarely 1 x yr     Social History     Tobacco Use   Smoking Status Former    Types: Cigarettes   Smokeless Tobacco Not on file     Social History     Substance and Sexual Activity   Drug Use Yes    Types: Marijuana       Current Outpatient Medications:     allopurinoL (ZYLOPRIM) 100 MG tablet, Take 1 tablet (100 mg total) by mouth once daily., Disp: 30 tablet, Rfl: 1    cetirizine (ZYRTEC) 10 MG tablet, Take 10 mg by mouth every morning., Disp: , Rfl:     clobetasol 0.05% (TEMOVATE) 0.05 % Oint, Apply topically 2 (two) times daily., Disp: 45 g, Rfl: 3    lisinopriL 10 MG tablet, Take 1 tablet (10 mg total) by mouth once daily., Disp: 30 tablet, Rfl: 3    risankizumab-rzaa (SKYRIZI)  150 mg/mL PnIj, 150 mg at weeks 0, 4, and then every 12 weeks thereafter., Disp: 4 mL, Rfl: 3    tenofovir alafenamide (VEMLIDY) 25 mg Tab, Take 1 tablet (25 mg total) by mouth once daily., Disp: 30 tablet, Rfl: 11    predniSONE (DELTASONE) 5 MG tablet, Take 1 tablet (5 mg total) by mouth once daily., Disp: 60 tablet, Rfl: 2     Objective:         Vitals:    07/08/24 1408   BP: 131/86   Pulse: 80       Physical Exam   Constitutional: He is oriented to person, place, and time. He appears well-developed.   HENT:   Head: Normocephalic.   Mouth/Throat: Mucous membranes are moist.   Pulmonary/Chest: Effort normal.   Abdominal: Soft.   Musculoskeletal:      Cervical back: Neck supple.      Comments: Cspine FROM no tenderness  Tspine FROM no tenderness  Lspine FROM no tenderness.  Shoulders: FROM; no synovitis;  Elbows: FROM; no synovitis; no tophi or nodules  Wrists: no tenderness or swelling   MCPs: FROM; no tenderness or synovitis    PIPs:FROM; no synovitis   DIPs: FROM; no synovitis;  HIPS: FROM  Knees: FROM; no synovitis; no instability  Ankles: FROM: no synovitis   Toes: no tenderness on palpation.     Lymphadenopathy:     He has no cervical adenopathy.   Neurological: He is alert and oriented to person, place, and time.   Skin: No rash noted.   No psoriatic plaques noted on exam. Now has hyperpigmentation in previous areas of psoriatic plaques    Vitals reviewed.       Media Information from 12/2023                        5/3    RIGHT LEG       Media Information      LEFT LEG       Media Information        Reviewed old and all outside pertinent medical records available.    All lab results personally reviewed and interpreted by me.  Lab Results   Component Value Date    WBC 21.97 (H) 01/24/2024    HGB 14.3 01/24/2024    HCT 42.5 01/24/2024    MCV 93 01/24/2024    MCH 31.2 (H) 01/24/2024    MCHC 33.6 01/24/2024    RDW 14.0 01/24/2024     01/24/2024    MPV 10.8 01/24/2024    NEUTROABS 4.1 10/12/2022     "LYMPHOPCT 39 10/12/2022       Lab Results   Component Value Date     03/25/2024    K 4.4 03/25/2024     03/25/2024    CO2 20 (L) 03/25/2024     03/25/2024    BUN 34 (H) 03/25/2024    CALCIUM 9.8 03/25/2024    PROT 8.9 (H) 01/24/2024    ALBUMIN 3.8 03/25/2024    BILITOT 1.4 (H) 01/24/2024    AST 42 01/24/2024    ALKPHOS 120 01/24/2024    ALT 64 (H) 01/24/2024       Lab Results   Component Value Date    COLORU Yellow 03/25/2024    APPEARANCEUA Clear 03/25/2024    SPECGRAV 1.010 03/25/2024    PHUR 6.0 03/25/2024    PROTEINUA Negative 03/25/2024    KETONESU Negative 03/25/2024    LEUKOCYTESUR 1+ (A) 03/25/2024    NITRITE Negative 03/25/2024    UROBILINOGEN Negative 03/25/2024       Lab Results   Component Value Date    CRP 10.9 (H) 12/27/2023       Lab Results   Component Value Date    RF <13.0 12/27/2023    SEDRATE 54 (H) 12/27/2023    CCPANTIBODIE <0.5 12/27/2023       No components found for: "25OHVITDTOT", "78XWBMMH8", "73ZHHSKP2", "METHODNOTE"    Lab Results   Component Value Date    URICACID 9.7 (H) 03/25/2024       Lab Results   Component Value Date    HEPBSAB 40.17 12/27/2023    HEPBSAB Reactive 12/27/2023    HEPBSAG Non-reactive 12/27/2023    HEPBSAG Negative 10/12/2022    HEPCAB Reactive (A) 12/27/2023       Imaging:  All imaging reviewed and independently interpreted by me.         ASSESSMENT / PLAN:     Ben Lerma is a 64 y.o. White male with  hypertension, diabetes who comes for evaluation of psoriatic arthritis     1. Severe Psoriasis, improved  -Extensive body surface involvement. PASI score of 11.9 -> 0.1 significant improvement of PsO with skyrizi   -continue Skyrizi 150 mg SC every 12 weeks. Favor IL-23 or Il-17 rather than TNF due to positive hepatitis B core Ab. Avoid MTX due to CKD  -Stop otezla BID now due to diarrhea    2. Psoriatic arthritis, improved  -low disease activity now. Having intermittent pain and swelling in bilateral wrists. Ask him to take pictures and let me " know   -See above for skyrizi.     3. History of gout   -SUA 10.7 in 2022 -> 9.4 .> 9.7  -has had many attacks in feet and knees.   -he never took allopurinol as prescribed. resume allopurinol 100 mg daily NOW. He feels he will be compliant since we are stopping otezla now  -start  prednisone 5 mg daily for gout prophylaxis. Avoid colchicine due to upset stomach and NSAIDs due to CKD.   -labs every 4 weeks  -discussed about gout diet    4. Hepatitis C antibody test positive  -VL in 10/2022 negative  -follows with hepatology  -was reassured     5. Hepatitis B core antibody positive  -Test was positive in 10/2022. Negative HepB surface Antigen  -On tenofovir for prophylaxis per hepatology as he is on skyrizi.     6. Hypertension and diabetes  -advised him to establish care with PCP. Sent referral last visit but has not started it yet     7. CKD stage 3b   -follows with nephrology  -avoid nephrotoxics     8. Other specified counseling  - over 10 minutes spent regarding below topics:  - Immunization counseling done.  - Weight loss counseling done.  - Nutrition and exercise counseling.  - Limitation of alcohol consumption.  - Regular exercise:  Aerobic and resistance.  - Medication counseling provided.  - provided temporary handicap tag due to PsA and gout flares.     9. Drug induced immunodeficiency  - recent labs reviewed  - no live vaccines  - vaccines per guidelines   - immunosuppression/infectious precautions reinforced      Follow up in about 3 months (around 10/8/2024).    Method of contact with patient concerns: [facility]    Disclaimer:  This note is prepared using voice recognition software and as such is likely to have errors and has not been proof read. Please contact me for questions.     Time spent: 30 minutes in face to face discussion concerning diagnosis, prognosis, review of lab and test results, benefits of treatment as well as management of disease, counseling of patient and  coordination of care between various health care providers.  Greater than half the time spent was used for coordination of care and counseling of patient.    Norma Mendoza M.D.  Rheumatology  [facility]

## 2024-07-09 ENCOUNTER — TELEPHONE (OUTPATIENT)
Dept: RHEUMATOLOGY | Facility: CLINIC | Age: 64
End: 2024-07-09
Payer: MEDICARE

## 2024-07-09 NOTE — TELEPHONE ENCOUNTER
----- Message from Norma Mendoza MD sent at 7/9/2024 11:05 AM CDT -----  Please call the patient and let him know. Uric acid is high which is expected. He needs to start taking allopurinol 100 mg daily now along with the prednisone 5 mg daily.   Kidney and liver function are overall stable. His calcium was a little bit elevated and this could be from dehydration. Advised him to keep himself hydrated

## 2024-07-09 NOTE — TELEPHONE ENCOUNTER
Called patient and discussed labs per Dr. Neumann. Patient voiced understandings and agrees to start allopurinol 100 mg daily along with the prednisone 5 mg.

## 2024-08-01 ENCOUNTER — HOSPITAL ENCOUNTER (OUTPATIENT)
Facility: HOSPITAL | Age: 64
LOS: 1 days | Discharge: HOME | End: 2024-08-01
Attending: EMERGENCY MEDICINE | Admitting: HOSPITALIST
Payer: MEDICARE

## 2024-08-01 VITALS
RESPIRATION RATE: 16 BRPM | TEMPERATURE: 97 F | SYSTOLIC BLOOD PRESSURE: 155 MMHG | HEIGHT: 70 IN | DIASTOLIC BLOOD PRESSURE: 71 MMHG | BODY MASS INDEX: 36.04 KG/M2 | OXYGEN SATURATION: 98 % | WEIGHT: 251.75 LBS | HEART RATE: 82 BPM

## 2024-08-01 DIAGNOSIS — L40.9 PSORIASIS: ICD-10-CM

## 2024-08-01 DIAGNOSIS — M1A.9XX0 CHRONIC GOUT WITHOUT TOPHUS, UNSPECIFIED CAUSE, UNSPECIFIED SITE: ICD-10-CM

## 2024-08-01 DIAGNOSIS — T78.3XXA ANGIOEDEMA, INITIAL ENCOUNTER: Primary | ICD-10-CM

## 2024-08-01 LAB
ALBUMIN SERPL BCP-MCNC: 3.7 G/DL (ref 3.5–5.2)
ALP SERPL-CCNC: 90 U/L (ref 55–135)
ALT SERPL W/O P-5'-P-CCNC: 35 U/L (ref 10–44)
ANION GAP SERPL CALC-SCNC: 7 MMOL/L (ref 8–16)
ANION GAP SERPL CALC-SCNC: 9 MMOL/L (ref 8–16)
AST SERPL-CCNC: 28 U/L (ref 10–40)
BASOPHILS # BLD AUTO: 0.02 K/UL (ref 0–0.2)
BASOPHILS # BLD AUTO: 0.03 K/UL (ref 0–0.2)
BASOPHILS NFR BLD: 0.2 % (ref 0–1.9)
BASOPHILS NFR BLD: 0.2 % (ref 0–1.9)
BILIRUB SERPL-MCNC: 0.4 MG/DL (ref 0.1–1)
BUN SERPL-MCNC: 33 MG/DL (ref 8–23)
BUN SERPL-MCNC: 35 MG/DL (ref 8–23)
CALCIUM SERPL-MCNC: 9.5 MG/DL (ref 8.7–10.5)
CALCIUM SERPL-MCNC: 9.6 MG/DL (ref 8.7–10.5)
CHLORIDE SERPL-SCNC: 105 MMOL/L (ref 95–110)
CHLORIDE SERPL-SCNC: 106 MMOL/L (ref 95–110)
CO2 SERPL-SCNC: 22 MMOL/L (ref 23–29)
CO2 SERPL-SCNC: 24 MMOL/L (ref 23–29)
CREAT SERPL-MCNC: 1.6 MG/DL (ref 0.5–1.4)
CREAT SERPL-MCNC: 1.6 MG/DL (ref 0.5–1.4)
DIFFERENTIAL METHOD BLD: ABNORMAL
DIFFERENTIAL METHOD BLD: ABNORMAL
EOSINOPHIL # BLD AUTO: 0 K/UL (ref 0–0.5)
EOSINOPHIL # BLD AUTO: 0.1 K/UL (ref 0–0.5)
EOSINOPHIL NFR BLD: 0 % (ref 0–8)
EOSINOPHIL NFR BLD: 1 % (ref 0–8)
ERYTHROCYTE [DISTWIDTH] IN BLOOD BY AUTOMATED COUNT: 13.7 % (ref 11.5–14.5)
ERYTHROCYTE [DISTWIDTH] IN BLOOD BY AUTOMATED COUNT: 13.8 % (ref 11.5–14.5)
EST. GFR  (NO RACE VARIABLE): 47.5 ML/MIN/1.73 M^2
EST. GFR  (NO RACE VARIABLE): 47.5 ML/MIN/1.73 M^2
GLUCOSE SERPL-MCNC: 128 MG/DL (ref 70–110)
GLUCOSE SERPL-MCNC: 169 MG/DL (ref 70–110)
HCT VFR BLD AUTO: 39.6 % (ref 40–54)
HCT VFR BLD AUTO: 40 % (ref 40–54)
HGB BLD-MCNC: 12.8 G/DL (ref 14–18)
HGB BLD-MCNC: 13.6 G/DL (ref 14–18)
IMM GRANULOCYTES # BLD AUTO: 0.06 K/UL (ref 0–0.04)
IMM GRANULOCYTES # BLD AUTO: 0.06 K/UL (ref 0–0.04)
IMM GRANULOCYTES NFR BLD AUTO: 0.4 % (ref 0–0.5)
IMM GRANULOCYTES NFR BLD AUTO: 0.5 % (ref 0–0.5)
LYMPHOCYTES # BLD AUTO: 0.7 K/UL (ref 1–4.8)
LYMPHOCYTES # BLD AUTO: 2.8 K/UL (ref 1–4.8)
LYMPHOCYTES NFR BLD: 20.6 % (ref 18–48)
LYMPHOCYTES NFR BLD: 5.6 % (ref 18–48)
MCH RBC QN AUTO: 31 PG (ref 27–31)
MCH RBC QN AUTO: 32.5 PG (ref 27–31)
MCHC RBC AUTO-ENTMCNC: 32.3 G/DL (ref 32–36)
MCHC RBC AUTO-ENTMCNC: 34 G/DL (ref 32–36)
MCV RBC AUTO: 96 FL (ref 82–98)
MCV RBC AUTO: 96 FL (ref 82–98)
MONOCYTES # BLD AUTO: 0.1 K/UL (ref 0.3–1)
MONOCYTES # BLD AUTO: 0.8 K/UL (ref 0.3–1)
MONOCYTES NFR BLD: 0.8 % (ref 4–15)
MONOCYTES NFR BLD: 6 % (ref 4–15)
NEUTROPHILS # BLD AUTO: 12.3 K/UL (ref 1.8–7.7)
NEUTROPHILS # BLD AUTO: 9.8 K/UL (ref 1.8–7.7)
NEUTROPHILS NFR BLD: 71.8 % (ref 38–73)
NEUTROPHILS NFR BLD: 92.9 % (ref 38–73)
NRBC BLD-RTO: 0 /100 WBC
NRBC BLD-RTO: 0 /100 WBC
PLATELET # BLD AUTO: 225 K/UL (ref 150–450)
PLATELET # BLD AUTO: 233 K/UL (ref 150–450)
PMV BLD AUTO: 10.9 FL (ref 9.2–12.9)
PMV BLD AUTO: 11.2 FL (ref 9.2–12.9)
POTASSIUM SERPL-SCNC: 4.3 MMOL/L (ref 3.5–5.1)
POTASSIUM SERPL-SCNC: 4.6 MMOL/L (ref 3.5–5.1)
PROT SERPL-MCNC: 7.3 G/DL (ref 6–8.4)
RBC # BLD AUTO: 4.13 M/UL (ref 4.6–6.2)
RBC # BLD AUTO: 4.18 M/UL (ref 4.6–6.2)
SODIUM SERPL-SCNC: 136 MMOL/L (ref 136–145)
SODIUM SERPL-SCNC: 137 MMOL/L (ref 136–145)
WBC # BLD AUTO: 13.25 K/UL (ref 3.9–12.7)
WBC # BLD AUTO: 13.61 K/UL (ref 3.9–12.7)

## 2024-08-01 PROCEDURE — 96372 THER/PROPH/DIAG INJ SC/IM: CPT | Performed by: EMERGENCY MEDICINE

## 2024-08-01 PROCEDURE — 25000003 PHARM REV CODE 250: Performed by: HOSPITALIST

## 2024-08-01 PROCEDURE — G0378 HOSPITAL OBSERVATION PER HR: HCPCS

## 2024-08-01 PROCEDURE — 80048 BASIC METABOLIC PNL TOTAL CA: CPT | Performed by: HOSPITALIST

## 2024-08-01 PROCEDURE — 85025 COMPLETE CBC W/AUTO DIFF WBC: CPT | Mod: 91 | Performed by: HOSPITALIST

## 2024-08-01 PROCEDURE — 85025 COMPLETE CBC W/AUTO DIFF WBC: CPT | Performed by: EMERGENCY MEDICINE

## 2024-08-01 PROCEDURE — 99900031 HC PATIENT EDUCATION (STAT)

## 2024-08-01 PROCEDURE — 36415 COLL VENOUS BLD VENIPUNCTURE: CPT | Performed by: HOSPITALIST

## 2024-08-01 PROCEDURE — 63600175 PHARM REV CODE 636 W HCPCS: Performed by: EMERGENCY MEDICINE

## 2024-08-01 PROCEDURE — 80053 COMPREHEN METABOLIC PANEL: CPT | Performed by: EMERGENCY MEDICINE

## 2024-08-01 PROCEDURE — 94761 N-INVAS EAR/PLS OXIMETRY MLT: CPT

## 2024-08-01 PROCEDURE — 96374 THER/PROPH/DIAG INJ IV PUSH: CPT

## 2024-08-01 PROCEDURE — 96375 TX/PRO/DX INJ NEW DRUG ADDON: CPT

## 2024-08-01 PROCEDURE — 25000003 PHARM REV CODE 250: Performed by: EMERGENCY MEDICINE

## 2024-08-01 PROCEDURE — 63600175 PHARM REV CODE 636 W HCPCS: Performed by: HOSPITALIST

## 2024-08-01 PROCEDURE — 92610 EVALUATE SWALLOWING FUNCTION: CPT

## 2024-08-01 PROCEDURE — 99285 EMERGENCY DEPT VISIT HI MDM: CPT | Mod: 25

## 2024-08-01 RX ORDER — FAMOTIDINE 10 MG/ML
20 INJECTION INTRAVENOUS 2 TIMES DAILY
Status: DISCONTINUED | OUTPATIENT
Start: 2024-08-01 | End: 2024-08-01 | Stop reason: HOSPADM

## 2024-08-01 RX ORDER — PREDNISONE 20 MG/1
40 TABLET ORAL DAILY
Qty: 8 TABLET | Refills: 0 | Status: SHIPPED | OUTPATIENT
Start: 2024-08-01 | End: 2024-08-05

## 2024-08-01 RX ORDER — CLOBETASOL PROPIONATE 0.5 MG/G
1 OINTMENT TOPICAL 2 TIMES DAILY
Start: 2024-08-01

## 2024-08-01 RX ORDER — PREDNISONE 20 MG/1
40 TABLET ORAL DAILY
Status: DISCONTINUED | OUTPATIENT
Start: 2024-08-01 | End: 2024-08-01 | Stop reason: HOSPADM

## 2024-08-01 RX ORDER — MUPIROCIN 20 MG/G
OINTMENT TOPICAL 2 TIMES DAILY
Status: DISCONTINUED | OUTPATIENT
Start: 2024-08-01 | End: 2024-08-01 | Stop reason: HOSPADM

## 2024-08-01 RX ORDER — AMLODIPINE BESYLATE 5 MG/1
5 TABLET ORAL DAILY
Qty: 90 TABLET | Refills: 0 | Status: SHIPPED | OUTPATIENT
Start: 2024-08-02 | End: 2024-10-31

## 2024-08-01 RX ORDER — DIPHENHYDRAMINE HYDROCHLORIDE 50 MG/ML
50 INJECTION INTRAMUSCULAR; INTRAVENOUS
Status: COMPLETED | OUTPATIENT
Start: 2024-08-01 | End: 2024-08-01

## 2024-08-01 RX ORDER — METHYLPREDNISOLONE SOD SUCC 125 MG
125 VIAL (EA) INJECTION
Status: COMPLETED | OUTPATIENT
Start: 2024-08-01 | End: 2024-08-01

## 2024-08-01 RX ORDER — DIPHENHYDRAMINE HYDROCHLORIDE 50 MG/ML
25 INJECTION INTRAMUSCULAR; INTRAVENOUS 2 TIMES DAILY
Status: DISCONTINUED | OUTPATIENT
Start: 2024-08-01 | End: 2024-08-01 | Stop reason: HOSPADM

## 2024-08-01 RX ORDER — FAMOTIDINE 10 MG/ML
20 INJECTION INTRAVENOUS
Status: COMPLETED | OUTPATIENT
Start: 2024-08-01 | End: 2024-08-01

## 2024-08-01 RX ORDER — AMLODIPINE BESYLATE 2.5 MG/1
2.5 TABLET ORAL DAILY
Status: DISCONTINUED | OUTPATIENT
Start: 2024-08-01 | End: 2024-08-01 | Stop reason: HOSPADM

## 2024-08-01 RX ORDER — SODIUM CHLORIDE, SODIUM LACTATE, POTASSIUM CHLORIDE, CALCIUM CHLORIDE 600; 310; 30; 20 MG/100ML; MG/100ML; MG/100ML; MG/100ML
INJECTION, SOLUTION INTRAVENOUS CONTINUOUS
Status: DISCONTINUED | OUTPATIENT
Start: 2024-08-01 | End: 2024-08-01 | Stop reason: HOSPADM

## 2024-08-01 RX ORDER — PREDNISONE 5 MG/1
10 TABLET ORAL DAILY
Start: 2024-08-01 | End: 2025-01-28

## 2024-08-01 RX ORDER — EPINEPHRINE 0.3 MG/.3ML
0.3 INJECTION SUBCUTANEOUS
Status: COMPLETED | OUTPATIENT
Start: 2024-08-01 | End: 2024-08-01

## 2024-08-01 RX ORDER — ALLOPURINOL 100 MG/1
100 TABLET ORAL DAILY
Status: DISCONTINUED | OUTPATIENT
Start: 2024-08-01 | End: 2024-08-01 | Stop reason: HOSPADM

## 2024-08-01 RX ADMIN — FAMOTIDINE 20 MG: 10 INJECTION, SOLUTION INTRAVENOUS at 04:08

## 2024-08-01 RX ADMIN — DIPHENHYDRAMINE HYDROCHLORIDE 50 MG: 50 INJECTION INTRAMUSCULAR; INTRAVENOUS at 04:08

## 2024-08-01 RX ADMIN — AMLODIPINE BESYLATE 2.5 MG: 2.5 TABLET ORAL at 01:08

## 2024-08-01 RX ADMIN — EPINEPHRINE 0.3 MG: 0.3 INJECTION INTRAMUSCULAR at 05:08

## 2024-08-01 RX ADMIN — METHYLPREDNISOLONE SODIUM SUCCINATE 125 MG: 125 INJECTION, POWDER, FOR SOLUTION INTRAMUSCULAR; INTRAVENOUS at 04:08

## 2024-08-01 RX ADMIN — DIPHENHYDRAMINE HYDROCHLORIDE 25 MG: 50 INJECTION INTRAMUSCULAR; INTRAVENOUS at 09:08

## 2024-08-01 RX ADMIN — SODIUM CHLORIDE, POTASSIUM CHLORIDE, SODIUM LACTATE AND CALCIUM CHLORIDE: 600; 310; 30; 20 INJECTION, SOLUTION INTRAVENOUS at 07:08

## 2024-08-01 RX ADMIN — PREDNISONE 40 MG: 20 TABLET ORAL at 04:08

## 2024-08-01 RX ADMIN — EPINEPHRINE 0.3 MG: 0.3 INJECTION INTRAMUSCULAR at 04:08

## 2024-08-01 RX ADMIN — METHYLPREDNISOLONE SODIUM SUCCINATE 80 MG: 40 INJECTION, POWDER, FOR SOLUTION INTRAMUSCULAR; INTRAVENOUS at 09:08

## 2024-08-01 RX ADMIN — ALLOPURINOL 100 MG: 100 TABLET ORAL at 09:08

## 2024-08-01 RX ADMIN — FAMOTIDINE 20 MG: 10 INJECTION, SOLUTION INTRAVENOUS at 09:08

## 2024-08-29 ENCOUNTER — PATIENT MESSAGE (OUTPATIENT)
Dept: RHEUMATOLOGY | Facility: CLINIC | Age: 64
End: 2024-08-29
Payer: MEDICARE

## 2024-09-23 ENCOUNTER — TELEPHONE (OUTPATIENT)
Dept: NEPHROLOGY | Facility: CLINIC | Age: 64
End: 2024-09-23
Payer: MEDICARE

## 2024-09-23 NOTE — TELEPHONE ENCOUNTER
Called and spoke with patient. Patient's appointment needed to be rescheduled due to MD being out of office day of current appointment. Appointment rescheduled.

## 2024-09-27 ENCOUNTER — OFFICE VISIT (OUTPATIENT)
Dept: URGENT CARE | Facility: CLINIC | Age: 64
End: 2024-09-27
Payer: MEDICARE

## 2024-09-27 VITALS
WEIGHT: 251 LBS | HEART RATE: 84 BPM | BODY MASS INDEX: 35.93 KG/M2 | SYSTOLIC BLOOD PRESSURE: 130 MMHG | RESPIRATION RATE: 18 BRPM | DIASTOLIC BLOOD PRESSURE: 80 MMHG | TEMPERATURE: 98 F | OXYGEN SATURATION: 97 % | HEIGHT: 70 IN

## 2024-09-27 DIAGNOSIS — M10.9 ACUTE GOUT OF LEFT HAND, UNSPECIFIED CAUSE: Primary | ICD-10-CM

## 2024-09-27 DIAGNOSIS — Z87.2 HISTORY OF PSORIATIC ARTHRITIS: ICD-10-CM

## 2024-09-27 RX ORDER — PREDNISONE 20 MG/1
40 TABLET ORAL DAILY
Qty: 10 TABLET | Refills: 0 | Status: SHIPPED | OUTPATIENT
Start: 2024-09-28 | End: 2024-10-03

## 2024-09-27 RX ORDER — DEXAMETHASONE SODIUM PHOSPHATE 4 MG/ML
8 INJECTION, SOLUTION INTRA-ARTICULAR; INTRALESIONAL; INTRAMUSCULAR; INTRAVENOUS; SOFT TISSUE
Status: COMPLETED | OUTPATIENT
Start: 2024-09-27 | End: 2024-09-27

## 2024-09-27 RX ADMIN — DEXAMETHASONE SODIUM PHOSPHATE 8 MG: 4 INJECTION, SOLUTION INTRA-ARTICULAR; INTRALESIONAL; INTRAMUSCULAR; INTRAVENOUS; SOFT TISSUE at 04:09

## 2024-09-27 NOTE — PROGRESS NOTES
"Subjective:      Patient ID: Ben Lerma is a 64 y.o. male.    Vitals:  height is 5' 10" (1.778 m) and weight is 113.9 kg (251 lb). His oral temperature is 97.6 °F (36.4 °C). His blood pressure is 130/80 and his pulse is 84. His respiration is 18 and oxygen saturation is 97%.     Chief Complaint: Hand Pain    Patient is a 64-year-old male with a past medical history of psoriasis, hypertension, chronic kidney disease, type 2 diabetes, hepatitis-B and C positive, shortness at arthritis and gout who presents to clinic for possible gout flare.  Patient reports symptoms x2 days.  Patient reports no trauma or injury.  Patient reports symptoms isolated to left hand.  Patient reports feels similar to prior gout flares.  Patient states that he is unable to take NSAIDs due to medicine he is currently on and kidney disease.  Patient states that he does not take colchicine as he does have that at home.  Patient states that indomethacin seems to help the best however can not take it because of kidneys.  Patient states that colchicine causes him to have upset stomach.  Patient states has previously been prescribed allopurinol however stopped taking it because of acute flares.  Patient states he has experienced pain, swelling, redness, and warmth of the left hand.  Patient states that symptoms likely triggered by [place] beef he previously ate.  Patient states does not drink alcohol.  Patient states tries to avoid red meat.  Patient states has not had any seafood or organ meats.  Patient states symptoms isolated to left hand.  Patient states that pain at current is rated an 8 or 9 on 10 scale but as worst a 10 on 10 scale.  Patient states he has not experienced any paresthesias to include numbness or tingling.  Patient states pain is aggravated by palpation and use of the hand.  Patient reports no alleviating factors to pain.    Hand Pain   His dominant hand is their left hand. There was no injury mechanism. The pain is present in " the left hand. The quality of the pain is described as stabbing and aching. The pain radiates to the left hand and left arm. The pain is at a severity of 10/10. The pain is severe. The pain has been Constant since the incident. Pertinent negatives include no chest pain or numbness. The symptoms are aggravated by movement and palpation. Treatments tried: allopurinol, skirizi. The treatment provided no relief.       Constitution: Negative. Negative for chills, sweating, fatigue and fever.   HENT: Negative.  Negative for ear pain, congestion and sore throat.    Neck: neck negative.   Cardiovascular: Negative.  Negative for chest pain and palpitations.   Eyes: Negative.    Respiratory: Negative.  Negative for chest tightness, cough and shortness of breath.    Gastrointestinal: Negative.  Negative for abdominal pain, nausea, vomiting and diarrhea.   Endocrine: negative.   Genitourinary: Negative.    Musculoskeletal:  Positive for joint pain (Left hand), joint swelling (Left hand) and abnormal ROM of joint (Left hand). Negative for trauma and muscle ache.   Skin:  Positive for erythema (Left hand).   Allergic/Immunologic: Negative.    Neurological:  Negative for dizziness, light-headedness, passing out, headaches, disorientation, altered mental status, numbness and tingling.   Hematologic/Lymphatic: Negative.    Psychiatric/Behavioral: Negative.  Negative for altered mental status, disorientation and confusion.       Objective:     Physical Exam   Constitutional: He is oriented to person, place, and time. He appears well-developed. He is cooperative.  Non-toxic appearance. He does not appear ill. No distress.   HENT:   Head: Normocephalic and atraumatic.   Ears:   Right Ear: Hearing and external ear normal.   Left Ear: Hearing and external ear normal.   Nose: Nose normal. No mucosal edema or nasal deformity. No epistaxis. Right sinus exhibits no maxillary sinus tenderness and no frontal sinus tenderness. Left sinus  exhibits no maxillary sinus tenderness and no frontal sinus tenderness.   Mouth/Throat: Uvula is midline, oropharynx is clear and moist and mucous membranes are normal. Mucous membranes are moist. No trismus in the jaw. Normal dentition. No uvula swelling. Oropharynx is clear.   Eyes: Conjunctivae and lids are normal. Pupils are equal, round, and reactive to light. Right eye exhibits no discharge. Left eye exhibits no discharge. No scleral icterus.   Neck: Trachea normal and phonation normal. Neck supple.   Cardiovascular: Normal rate, regular rhythm and normal pulses.   Pulmonary/Chest: Effort normal and breath sounds normal. No respiratory distress. He has no wheezes. He has no rhonchi. He has no rales.   Abdominal: Normal appearance. He exhibits no distension. Soft.   Musculoskeletal:      Left hand: He exhibits decreased range of motion, tenderness and swelling (Erythema and warmth).   Neurological: He is alert and oriented to person, place, and time. He exhibits normal muscle tone.   Skin: Skin is warm, dry, intact and not diaphoretic. Capillary refill takes 2 to 3 seconds. erythema (Left hand)   Psychiatric: His speech is normal and behavior is normal. Judgment and thought content normal.   Nursing note and vitals reviewed.      Assessment:     1. Acute gout of left hand, unspecified cause    2. History of psoriatic arthritis        Plan:       Acute gout of left hand, unspecified cause    History of psoriatic arthritis    Other orders  -     dexAMETHasone injection 8 mg  -     predniSONE (DELTASONE) 20 MG tablet; Take 2 tablets (40 mg total) by mouth once daily. for 5 days  Dispense: 10 tablet; Refill: 0                Ring finger on left hand appears tight.  Offered to help remove this however patient refused stating wants to keep it on.  Patient offered x-ray of the left hand however refused.    Patient offered labs however refused.    Patient states has lab visit in less than a week for blood draw.     Decadron 8 mg IM in clinic.  Patient tolerated well.  No complications noted.    Patient advised to closely watch blood sugar and diet over the next week.  Patient states that he was only prediabetic.  Patient does report watching strict diet.    Tylenol per package instructions for pain.    Follow-up with PCP in 1-2 days.    Return to clinic as needed.    To ED for any new or acutely worsening symptoms.    Patient in agreement with plan of care.    DISCLAIMER: Please note that my documentation in this Electronic Healthcare Record was produced using speech recognition software and therefore may contain errors related to that software system.These could include grammar, punctuation and spelling errors or the inclusion/exclusion of phrases that were not intended. Garbled syntax, mangled pronouns, and other bizarre constructions may be attributed to that software system.

## 2024-10-18 ENCOUNTER — LAB VISIT (OUTPATIENT)
Dept: LAB | Facility: HOSPITAL | Age: 64
End: 2024-10-18
Payer: MEDICARE

## 2024-10-18 ENCOUNTER — OFFICE VISIT (OUTPATIENT)
Dept: FAMILY MEDICINE | Facility: CLINIC | Age: 64
End: 2024-10-18
Payer: MEDICARE

## 2024-10-18 VITALS
SYSTOLIC BLOOD PRESSURE: 128 MMHG | HEIGHT: 70 IN | DIASTOLIC BLOOD PRESSURE: 82 MMHG | WEIGHT: 250.69 LBS | BODY MASS INDEX: 35.89 KG/M2 | RESPIRATION RATE: 18 BRPM | OXYGEN SATURATION: 97 % | HEART RATE: 74 BPM

## 2024-10-18 DIAGNOSIS — Z87.39 HISTORY OF GOUT: ICD-10-CM

## 2024-10-18 DIAGNOSIS — N18.32 TYPE 2 DIABETES MELLITUS WITH STAGE 3B CHRONIC KIDNEY DISEASE, WITHOUT LONG-TERM CURRENT USE OF INSULIN: ICD-10-CM

## 2024-10-18 DIAGNOSIS — E11.22 TYPE 2 DIABETES MELLITUS WITH STAGE 3B CHRONIC KIDNEY DISEASE, WITHOUT LONG-TERM CURRENT USE OF INSULIN: ICD-10-CM

## 2024-10-18 DIAGNOSIS — I10 PRIMARY HYPERTENSION: ICD-10-CM

## 2024-10-18 DIAGNOSIS — R09.82 POST-NASAL DRIP: ICD-10-CM

## 2024-10-18 DIAGNOSIS — R76.8 HEPATITIS B CORE ANTIBODY POSITIVE: ICD-10-CM

## 2024-10-18 DIAGNOSIS — H93.19 TINNITUS, UNSPECIFIED LATERALITY: Primary | ICD-10-CM

## 2024-10-18 DIAGNOSIS — N18.32 STAGE 3B CHRONIC KIDNEY DISEASE (CKD): ICD-10-CM

## 2024-10-18 DIAGNOSIS — L40.50 PSORIATIC ARTHRITIS: ICD-10-CM

## 2024-10-18 DIAGNOSIS — H53.9 VISION CHANGES: ICD-10-CM

## 2024-10-18 LAB
ALBUMIN SERPL BCP-MCNC: 4 G/DL (ref 3.5–5.2)
ALP SERPL-CCNC: 106 U/L (ref 40–150)
ALT SERPL W/O P-5'-P-CCNC: 38 U/L (ref 10–44)
ANION GAP SERPL CALC-SCNC: 11 MMOL/L (ref 8–16)
AST SERPL-CCNC: 39 U/L (ref 10–40)
BILIRUB SERPL-MCNC: 0.5 MG/DL (ref 0.1–1)
BUN SERPL-MCNC: 26 MG/DL (ref 8–23)
CALCIUM SERPL-MCNC: 10 MG/DL (ref 8.7–10.5)
CHLORIDE SERPL-SCNC: 109 MMOL/L (ref 95–110)
CHOLEST SERPL-MCNC: 216 MG/DL (ref 120–199)
CHOLEST/HDLC SERPL: 4.6 {RATIO} (ref 2–5)
CO2 SERPL-SCNC: 22 MMOL/L (ref 23–29)
CREAT SERPL-MCNC: 1.6 MG/DL (ref 0.5–1.4)
EST. GFR  (NO RACE VARIABLE): 47.5 ML/MIN/1.73 M^2
ESTIMATED AVG GLUCOSE: 114 MG/DL (ref 68–131)
GLUCOSE SERPL-MCNC: 110 MG/DL (ref 70–110)
HBA1C MFR BLD: 5.6 % (ref 4–5.6)
HDLC SERPL-MCNC: 47 MG/DL (ref 40–75)
HDLC SERPL: 21.8 % (ref 20–50)
LDLC SERPL CALC-MCNC: 143.4 MG/DL (ref 63–159)
NONHDLC SERPL-MCNC: 169 MG/DL
POTASSIUM SERPL-SCNC: 4.1 MMOL/L (ref 3.5–5.1)
PROT SERPL-MCNC: 8 G/DL (ref 6–8.4)
SODIUM SERPL-SCNC: 142 MMOL/L (ref 136–145)
TRIGL SERPL-MCNC: 128 MG/DL (ref 30–150)

## 2024-10-18 PROCEDURE — 99215 OFFICE O/P EST HI 40 MIN: CPT | Mod: PBBFAC,PO

## 2024-10-18 PROCEDURE — 80053 COMPREHEN METABOLIC PANEL: CPT

## 2024-10-18 PROCEDURE — 99999 PR PBB SHADOW E&M-EST. PATIENT-LVL V: CPT | Mod: PBBFAC,,,

## 2024-10-18 PROCEDURE — 80061 LIPID PANEL: CPT

## 2024-10-18 PROCEDURE — 83036 HEMOGLOBIN GLYCOSYLATED A1C: CPT

## 2024-10-18 PROCEDURE — 36415 COLL VENOUS BLD VENIPUNCTURE: CPT | Mod: PO

## 2024-10-18 RX ORDER — DICLOFENAC SODIUM 10 MG/G
2 GEL TOPICAL 4 TIMES DAILY
Qty: 150 G | Refills: 3 | Status: SHIPPED | OUTPATIENT
Start: 2024-10-18 | End: 2025-04-16

## 2024-10-18 NOTE — PROGRESS NOTES
Subjective:       Patient ID:  [id number]     Chief Complaint: Establish Care      History of Present Illness      Mr. Lerma presents today to establish care.  He has a history of chronic kidney disease, diabetes, hypertension, psoriatic arthritis, gout, fatty liver disease, and Hepatitis B. He is followed by multiple specialists including a nephrologist, hepatologist, and rheumatologist.     . He takes amlodipine for hypertension, an antiviral for Hepatitis B prophylaxis, Skyrizi (administered every three months) for psoriatic arthritis, allopurinol daily for gout, and prednisone sporadically for gout flares.     Patient reports a couple acute complaints today.  He reports constant ear ringing, persistent sinus drainage, and a bothersome cough that he feels is not originating from his lungs.  For allergies/sinusitis , he uses Zyrtec as needed and has Flonase but does not use it regularly.   He also experiences severe and constant pain in his wrists and fingers, but denies taking pain medication for this.  He recently started Skyrizi, having completed his first regular dosage injection. 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 He is a former smoker who quit approximately 20 years ago.      His mother had colon cancer diagnosed in her 60s, lupus, and diabetes. His father is 90 years old with hypertension. No significant health issues reported for siblings    He is overdue for an eye exam and reports difficulty reading. He is due for flu, COVID, RSV, shingles, tetanus, and pneumonia vaccines. He has received initial COVID vaccinations including [employer] and a booster, but expresses reluctance to receive a flu vaccine at this time.       No other concerns today.     Past Medical History:   Diagnosis Date    Allergy     Arthritis     Hypertension       Active Problem List with Overview Notes    Diagnosis  Date Noted    Acute angioedema 08/01/2024    Stage 3b chronic kidney disease (CKD) 03/18/2024    Type 2 diabetes mellitus with stage 3b chronic kidney disease, without long-term current use of insulin 03/18/2024    Primary hypertension 03/18/2024    History of gout 03/18/2024    Severe obesity (BMI 35.0-39.9) with comorbidity 02/23/2024    Psoriatic arthritis 12/27/2023    Psoriasis 12/27/2023    Hepatitis C antibody test positive 12/27/2023    Hepatitis B core antibody positive 12/27/2023      Review of patient's allergies indicates:   Allergen Reactions    Lisinopril Swelling     Angioedema          Current Outpatient Medications:     allopurinoL (ZYLOPRIM) 100 MG tablet, Take 1 tablet (100 mg total) by mouth once daily., Disp: 30 tablet, Rfl: 1    amLODIPine (NORVASC) 5 MG tablet, Take 1 tablet (5 mg total) by mouth once daily., Disp: 90 tablet, Rfl: 0    clobetasol 0.05% (TEMOVATE) 0.05 % Oint, Apply 1 g topically 2 (two) times daily., Disp: , Rfl:     predniSONE (DELTASONE) 5 MG tablet, Take 2 tablets (10 mg total) by mouth once daily., Disp: , Rfl:     risankizumab-rzaa (SKYRIZI) 150 mg/mL PnIj, 150 mg at weeks 0, 4, and then every 12 weeks thereafter., Disp: 4 mL, Rfl: 3    tenofovir alafenamide (VEMLIDY) 25 mg Tab, Take 1 tablet (25 mg total) by mouth once daily., Disp: 30 tablet, Rfl: 11    cetirizine (ZYRTEC) 10 MG tablet, Take 10 mg by mouth every morning. (Patient not taking: Reported on 10/18/2024), Disp: , Rfl:     diclofenac sodium (VOLTAREN ARTHRITIS PAIN) 1 % Gel, Apply 2 g topically 4 (four) times daily., Disp: 150 g, Rfl: 3    Lab Results   Component Value Date    WBC 13.25 (H) 08/01/2024    HGB 13.6 (L) 08/01/2024    HCT 40.0 08/01/2024     08/01/2024    CHOL 231 (H) 10/12/2022    TRIG 198 (H) 10/12/2022    HDL 47 10/12/2022    ALT 35 08/01/2024    AST 28 08/01/2024     08/01/2024    K 4.3 08/01/2024     08/01/2024    CREATININE 1.6 (H) 08/01/2024    BUN 33 (H) 08/01/2024     CO2 22 (L) 08/01/2024    TSH 1.220 10/12/2022    HGBA1C 5.5 03/25/2024       Review of Systems   Constitutional:  Negative for fatigue and fever.   HENT:  Positive for congestion. Negative for sneezing and sore throat.    Respiratory:  Positive for cough. Negative for shortness of breath and wheezing.    Cardiovascular:  Negative for chest pain, palpitations and leg swelling.   Gastrointestinal:  Negative for abdominal pain, nausea and vomiting.   Genitourinary: Negative.    Musculoskeletal:  Positive for arthralgias (chronic).   Skin: Negative.  Negative for rash.   Neurological:  Negative for dizziness, weakness, light-headedness, numbness and headaches.   Hematological: Negative.    Psychiatric/Behavioral: Negative.     All other systems reviewed and are negative.      Objective:      Physical Exam  Constitutional:       Appearance: Normal appearance.   HENT:      Head: Normocephalic and atraumatic.      Right Ear: A middle ear effusion is present.      Left Ear: A middle ear effusion is present.      Nose: Nose normal.      Right Sinus: No maxillary sinus tenderness or frontal sinus tenderness.      Left Sinus: No maxillary sinus tenderness or frontal sinus tenderness.      Mouth/Throat:      Pharynx: Posterior oropharyngeal erythema present.   Eyes:      Extraocular Movements: Extraocular movements intact.   Cardiovascular:      Rate and Rhythm: Normal rate and regular rhythm.   Pulmonary:      Effort: Pulmonary effort is normal.      Breath sounds: Normal breath sounds.   Musculoskeletal:         General: Normal range of motion.      Cervical back: Normal range of motion.   Lymphadenopathy:      Cervical: No cervical adenopathy.   Skin:     General: Skin is warm and dry.   Neurological:      General: No focal deficit present.      Mental Status: He is alert and oriented to person, place, and time.   Psychiatric:         Mood and Affect: Mood normal.         Assessment:       1. Tinnitus, unspecified laterality     2. Vision changes    3. Post-nasal drip    4. Type 2 diabetes mellitus with stage 3b chronic kidney disease, without long-term current use of insulin    5. Stage 3b chronic kidney disease (CKD)    6. Primary hypertension    7. History of gout    8. Psoriatic arthritis        Plan:       Ben was seen today for establish care.    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  Refer to ENT for further assessment.  -     Ambulatory referral/consult to ENT; Future    Vision changes  -     Ambulatory referral/consult to Ophthalmology; Future    Post-nasal drip  - Explained postnasal drip as likely cause of throat irritation and cough.  - continue Zyrtec and Flonase daily for symptoms.  If symptoms persist I recommend follow up.    Type 2 diabetes mellitus with stage 3b chronic kidney disease, without long-term current use of insulin  -     Hemoglobin A1C; Future  -     Lipid Panel; Future  -     Ambulatory referral/consult to Ophthalmology; Future  - Most recent A1C: 5.5 3/2024, ordered for update  - CMP : ordered   - Micro: UTD 3/2024 - WNL  - discussed medication compliance   - yearly eye exam recommended  - LDL goal <70  - discussed importance of frequent foot exam     Stage 3b chronic kidney disease (CKD)  - Cr 1.6 and GFR 47.5   - avoid nephrotoxic drugs.  - continue to follow up with Nephrology    Primary hypertension  - Well controlled today  - continue current regimen  -     COMPREHENSIVE METABOLIC PANEL; Future  - discussed dash diet, exercise/weight loss, and increased cardiovascular exercise   - monitor BP at home w/ goal <130/80    History of gout  - Continue medications as prescribed.  Continue to follow up with Rheumatology.  Reviewed low purine diet.    Psoriatic arthritis  -     diclofenac sodium (VOLTAREN ARTHRITIS PAIN) 1 % Gel; Apply 2 g topically 4 (four) times daily.  - continue to follow up  with Rheumatology.  - continue skyrizi        Positive hepatitis-B core antibody  - continue to follow-up with hepatology  - continue current medication regimen    COLON CANCER SCREENING:  - Discussed importance of colon cancer screening, especially given family history.  - Provided information on colonoscopy as gold standard for colon cancer screening, with alternatives like FOBT and Cologuard available.  - Mr. Lerma to consider options for colon cancer screening.    FOLLOW UP:  - Follow up with Dr. Davis (supervising physician) in 3-6 months to establish care.  - Follow up with rheumatologist to discuss ongoing joint pain and potential medication adjustments.   - continue to follow-up with other specialties        Future Appointments       Date Provider Specialty Appt Notes    10/18/2024  Lab Type 2 diabetes mellitus with stage 3b chronic kidney disease, without long-term current use of insulin    10/30/2024  Hepatology FS    10/30/2024 Janeth Cuevas NP Hepatology FOLLOW UP    2/11/2025 Rudolph Maciel, OD Optometry Type 2 diabetes mellitus with stage 3b chronic kidney disease, without long-term current use of insulin    4/21/2025 Arin Davis MD Family Medicine follow up           I spent a total of 46 minutes on the day of the visit.This includes face to face time and non-face to face time preparing to see the patient (eg, review of tests), obtaining and/or reviewing separately obtained history, documenting clinical information in the electronic or other health record, independently interpreting results and communicating results to the patient/family/caregiver, or care coordinator.     Portions of this note were dictated using voice recognition software and may contain dictation related errors in spelling / grammar / syntax not discovered on text review.     Cheri Borden PA-C

## 2024-10-23 RX ORDER — ATORVASTATIN CALCIUM 10 MG/1
10 TABLET, FILM COATED ORAL DAILY
Qty: 90 TABLET | Refills: 1 | Status: SHIPPED | OUTPATIENT
Start: 2024-10-23 | End: 2025-04-21

## 2024-10-30 ENCOUNTER — OFFICE VISIT (OUTPATIENT)
Facility: CLINIC | Age: 64
End: 2024-10-30
Payer: MEDICARE

## 2024-10-30 ENCOUNTER — PROCEDURE VISIT (OUTPATIENT)
Facility: CLINIC | Age: 64
End: 2024-10-30
Payer: MEDICARE

## 2024-10-30 VITALS — BODY MASS INDEX: 36.63 KG/M2 | HEIGHT: 70 IN | WEIGHT: 255.88 LBS

## 2024-10-30 DIAGNOSIS — E11.22 TYPE 2 DIABETES MELLITUS WITH STAGE 3B CHRONIC KIDNEY DISEASE, WITHOUT LONG-TERM CURRENT USE OF INSULIN: ICD-10-CM

## 2024-10-30 DIAGNOSIS — K76.0 METABOLIC DYSFUNCTION-ASSOCIATED STEATOTIC LIVER DISEASE (MASLD): Primary | ICD-10-CM

## 2024-10-30 DIAGNOSIS — E66.812 CLASS 2 SEVERE OBESITY WITH SERIOUS COMORBIDITY AND BODY MASS INDEX (BMI) OF 36.0 TO 36.9 IN ADULT, UNSPECIFIED OBESITY TYPE: ICD-10-CM

## 2024-10-30 DIAGNOSIS — E66.01 CLASS 2 SEVERE OBESITY WITH SERIOUS COMORBIDITY AND BODY MASS INDEX (BMI) OF 36.0 TO 36.9 IN ADULT, UNSPECIFIED OBESITY TYPE: ICD-10-CM

## 2024-10-30 DIAGNOSIS — N18.32 TYPE 2 DIABETES MELLITUS WITH STAGE 3B CHRONIC KIDNEY DISEASE, WITHOUT LONG-TERM CURRENT USE OF INSULIN: ICD-10-CM

## 2024-10-30 DIAGNOSIS — D84.821 IMMUNOSUPPRESSION DUE TO DRUG THERAPY: ICD-10-CM

## 2024-10-30 DIAGNOSIS — R76.8 HEPATITIS B CORE ANTIBODY POSITIVE: ICD-10-CM

## 2024-10-30 DIAGNOSIS — R76.8 HEPATITIS C ANTIBODY TEST POSITIVE: ICD-10-CM

## 2024-10-30 DIAGNOSIS — Z79.899 IMMUNOSUPPRESSION DUE TO DRUG THERAPY: ICD-10-CM

## 2024-10-30 PROCEDURE — 99214 OFFICE O/P EST MOD 30 MIN: CPT | Mod: S$PBB,,, | Performed by: NURSE PRACTITIONER

## 2024-10-30 PROCEDURE — 99999 PR PBB SHADOW E&M-EST. PATIENT-LVL III: CPT | Mod: PBBFAC,,, | Performed by: NURSE PRACTITIONER

## 2024-10-30 PROCEDURE — 99213 OFFICE O/P EST LOW 20 MIN: CPT | Mod: PBBFAC,25,PN | Performed by: NURSE PRACTITIONER

## 2024-10-30 PROCEDURE — 91200 LIVER ELASTOGRAPHY: CPT | Mod: PBBFAC,PN | Performed by: NURSE PRACTITIONER

## 2024-10-30 NOTE — PROGRESS NOTES
[facility] - Established Patient    Last Clinic Visit: 24    Chief Complaint: Follow-up for positive HBV core Ab        HISTORY     This is a 64 y.o. male with PMH noted below, here for follow-up of positive HBV core Ab.     Labs from 23  showed a positive hepatitis B core antibody. Hepatitis B surface Ag negative and DNA undetected, indicating no current/chronic infection.     HCV Ab also noted to be positive though RNA negative, indicating no current infection.      No known history of hepatitis or prior exposure.  No family history of hepatitis B.  He has a h/o drug use though >40 years ago. Has multiple tattoos that are many years old. He had 1 friend that had hepatitis and  from liver failure.      History of IVIG - no    Currently followed by Rheumatology for psoriasis.  Medication regimen - previously on otezla and low dose prednisone though was planning to switch to cosentyx.     We started Vemldiy for HBV prophylaxis given moderate risk of reactivation with cosentyx.     Liver enzymes noted to be mildly elevated for the last year, 30-60s. Abd US showed hepatomegaly and hepatic steatosis.    His risk factors for fatty liver include-  BMI >35  HTN, HLD, DM  No alcohol use     Interval history:   Rheumatology has changed him to Skyriz. Still having some joint pains. Taking prednisone 10 mg daily.    Doing well with Vemlidy, no issues.  Needs updated HBV labs.    No symptoms of hepatic decompensation including jaundice, ascites, cognitive problems to suggest hepatic encephalopathy, or GI bleeding.     Fibroscan attempted prior to visit-  kPa 19.8, F4  , S3  Scan images questionable and IQR% high     Updates on risk factors for fatty liver:  Weight -- Body mass index is 36.71 kg/m².         Weight is up a few lb from last visit                 Dyslipidemia -- mildly elevated   On statin                              Insulin resistance / diabetes -- HgbA1c 5.6          Alcohol  use -- none    Liver enzymes: minimally elevated, 30s    Health Maintenance:  - Most recent imaging: abd US 4/30/24  - Hepatitis A vaccination: +immunity          Past medical history, surgical history, problem list, family history, social history, allergies: Reviewed and updated in the appropriate section of the electronic medical record.      Current Outpatient Medications   Medication Sig Dispense Refill    allopurinoL (ZYLOPRIM) 100 MG tablet Take 1 tablet (100 mg total) by mouth once daily. 30 tablet 1    amLODIPine (NORVASC) 5 MG tablet Take 1 tablet (5 mg total) by mouth once daily. 90 tablet 0    atorvastatin (LIPITOR) 10 MG tablet Take 1 tablet (10 mg total) by mouth once daily. 90 tablet 1    cetirizine (ZYRTEC) 10 MG tablet Take 10 mg by mouth every morning.      clobetasol 0.05% (TEMOVATE) 0.05 % Oint Apply 1 g topically 2 (two) times daily.      diclofenac sodium (VOLTAREN ARTHRITIS PAIN) 1 % Gel Apply 2 g topically 4 (four) times daily. 150 g 3    predniSONE (DELTASONE) 5 MG tablet Take 2 tablets (10 mg total) by mouth once daily.      risankizumab-rzaa (SKYRIZI) 150 mg/mL PnIj 150 mg at weeks 0, 4, and then every 12 weeks thereafter. 4 mL 3    tenofovir alafenamide (VEMLIDY) 25 mg Tab Take 1 tablet (25 mg total) by mouth once daily. 30 tablet 11     No current facility-administered medications for this visit.     Medication list reviewed and updated.      Review of Systems - as per HPI  Constitutional: Negative for unexpected weight change.   Respiratory: Negative for shortness of breath.    Cardiovascular: Negative for leg swelling.  Gastrointestinal: Negative for abdominal distention or abdominal pain. Negative for melena or hematemesis.  Musculoskeletal: Negative for myalgias.    Skin: Negative for jaundice or itching.  Neurological: Negative for confusion or slowed mentation. Negative for tremors.   Hematological: Does not bruise/bleed easily.       Physical Exam   Constitutional: No distress.  "Alert and oriented.  Eyes: No scleral icterus.   Pulmonary/Chest: Respiratory effort normal. No respiratory distress.   Abdominal: No distension, no ascites appreciated.   Extremities: No edema.   Neurological: No tremor.   Skin: No jaundice.   Psychiatric: Normal mood and affect. Speech, behavior, and thought content normal.       LABS & DIAGNOSTIC STUDIES     I have personally reviewed pertinent laboratory findings:    Lab Results   Component Value Date    ALT 38 10/18/2024    AST 39 10/18/2024    ALKPHOS 106 10/18/2024    BILITOT 0.5 10/18/2024    ALBUMIN 4.0 10/18/2024       Lab Results   Component Value Date    WBC 13.25 (H) 08/01/2024    HGB 13.6 (L) 08/01/2024    HCT 40.0 08/01/2024    MCV 96 08/01/2024     08/01/2024       Lab Results   Component Value Date     10/18/2024    K 4.1 10/18/2024    BUN 26 (H) 10/18/2024    CREATININE 1.6 (H) 10/18/2024       Lab Results   Component Value Date    HEPBSAG Non-reactive 12/27/2023    HEPBCAB Reactive (A) 12/27/2023    HEPCAB Reactive (A) 12/27/2023       No results found for: "AFP"      I have personally reviewed the following result reports:  Abdominal US - 4/30/24      ASSESSMENT & PLAN     64 y.o. male with:    1. Metabolic dysfunction-associated steatotic liver disease (MASLD)    2. Class 2 severe obesity with serious comorbidity and body mass index (BMI) of 36.0 to 36.9 in adult, unspecified obesity type    3. Hepatitis B core antibody positive    4. Hepatitis C antibody test positive    5. Type 2 diabetes mellitus with stage 3b chronic kidney disease, without long-term current use of insulin    6. Immunosuppression due to drug therapy        HBV core Ab positive- on Vemlidy for prophylaxis given moderate risk of HBV reactivation with Skyrizi (1-10%).     Fibroscan shows severe steatosis but also suggests cirrhosis. Staff reported difficulty obtaining consistent readings though all were high. IQR is also high, ?accuracy of results. "     Recommendations:   Continue Vemlidy. He will reach out if med regimen changes at any time so that need for prophylaxis can be reassessed   Labs every 3-6 months: CMP, hep B surface Ag, HBV DNA  Discussed options to further evaluate fibrosis staging, will plan for MRI elastography   Encouraged ongoing weight loss efforts. Mediterranean diet - diet should be low in carbohydrates, added sugars, and processed foods. Recommend increasing protein and fiber intake.   150 min/week of moderate exercise (aerobic + resistance), as tolerated  Maintain good control of blood pressure, cholesterol, and blood sugar      Orders Placed This Encounter   Procedures    MR Elastography       Return to clinic in 2-3 months.      Thank you for allowing me to participate in the care of SHOSHANA HerbertP-C  Hepatology        Duration of encounter: 30 min  This includes face to face time and non-face to face time preparing to see the patient (eg, review of tests), obtaining and/or reviewing separately obtained history, documenting clinical information in the electronic or other health record, independently interpreting results and communicating results to the patient/family/caregiver, or Care coordination.

## 2024-11-05 NOTE — PATIENT INSTRUCTIONS
MRI elastography to further evaluate for liver fibrosis/damage from fatty liver  Continue Vemlidy

## 2024-11-13 ENCOUNTER — HOSPITAL ENCOUNTER (OUTPATIENT)
Dept: RADIOLOGY | Facility: HOSPITAL | Age: 64
Discharge: HOME | End: 2024-11-13
Attending: NURSE PRACTITIONER
Payer: MEDICARE

## 2024-11-13 DIAGNOSIS — E66.812 CLASS 2 SEVERE OBESITY WITH SERIOUS COMORBIDITY AND BODY MASS INDEX (BMI) OF 36.0 TO 36.9 IN ADULT, UNSPECIFIED OBESITY TYPE: ICD-10-CM

## 2024-11-13 DIAGNOSIS — K76.0 METABOLIC DYSFUNCTION-ASSOCIATED STEATOTIC LIVER DISEASE (MASLD): ICD-10-CM

## 2024-11-13 DIAGNOSIS — E66.01 CLASS 2 SEVERE OBESITY WITH SERIOUS COMORBIDITY AND BODY MASS INDEX (BMI) OF 36.0 TO 36.9 IN ADULT, UNSPECIFIED OBESITY TYPE: ICD-10-CM

## 2024-11-13 PROCEDURE — 76391 MR ELASTOGRAPHY: CPT | Mod: TC,PO

## 2024-11-13 PROCEDURE — 76391 MR ELASTOGRAPHY: CPT | Mod: 26,,, | Performed by: RADIOLOGY

## 2024-11-19 ENCOUNTER — OFFICE VISIT (OUTPATIENT)
Dept: URGENT CARE | Facility: CLINIC | Age: 64
End: 2024-11-19
Payer: MEDICARE

## 2024-11-19 VITALS
SYSTOLIC BLOOD PRESSURE: 127 MMHG | BODY MASS INDEX: 35.36 KG/M2 | WEIGHT: 247 LBS | TEMPERATURE: 98 F | HEIGHT: 70 IN | DIASTOLIC BLOOD PRESSURE: 90 MMHG | RESPIRATION RATE: 17 BRPM | OXYGEN SATURATION: 98 % | HEART RATE: 70 BPM

## 2024-11-19 DIAGNOSIS — M10.361 ACUTE GOUT DUE TO RENAL IMPAIRMENT INVOLVING RIGHT KNEE: Primary | ICD-10-CM

## 2024-11-19 PROCEDURE — 99213 OFFICE O/P EST LOW 20 MIN: CPT | Mod: 25,S$GLB,, | Performed by: NURSE PRACTITIONER

## 2024-11-19 PROCEDURE — 96372 THER/PROPH/DIAG INJ SC/IM: CPT | Mod: S$GLB,,, | Performed by: EMERGENCY MEDICINE

## 2024-11-19 RX ORDER — COLCHICINE 0.6 MG/1
1.2 TABLET ORAL DAILY
Qty: 3 TABLET | Refills: 0 | Status: SHIPPED | OUTPATIENT
Start: 2024-11-19 | End: 2024-11-20

## 2024-11-19 RX ORDER — DEXAMETHASONE SODIUM PHOSPHATE 4 MG/ML
8 INJECTION, SOLUTION INTRA-ARTICULAR; INTRALESIONAL; INTRAMUSCULAR; INTRAVENOUS; SOFT TISSUE
Status: COMPLETED | OUTPATIENT
Start: 2024-11-19 | End: 2024-11-19

## 2024-11-19 RX ORDER — METHYLPREDNISOLONE 4 MG/1
TABLET ORAL
Qty: 21 EACH | Refills: 0 | Status: SHIPPED | OUTPATIENT
Start: 2024-11-19 | End: 2024-11-19 | Stop reason: ALTCHOICE

## 2024-11-19 RX ADMIN — DEXAMETHASONE SODIUM PHOSPHATE 8 MG: 4 INJECTION, SOLUTION INTRA-ARTICULAR; INTRALESIONAL; INTRAMUSCULAR; INTRAVENOUS; SOFT TISSUE at 04:11

## 2024-11-19 NOTE — PATIENT INSTRUCTIONS
Increase clear fluid intake  May apply ice to affected area for 15 minutes every 2 hours.    Elevate area at rest  Take colchicine as directed.    Follow-up with primary care provider   Return to clinic for new or worse symptoms

## 2024-11-19 NOTE — PROGRESS NOTES
"Subjective:      Patient ID: Ben Lerma is a 64 y.o. male.    Vitals:  height is 5' 10" (1.778 m) and weight is 112 kg (247 lb). His temperature is 97.8 °F (36.6 °C). His blood pressure is 127/90 (abnormal) and his pulse is 70. His respiration is 17 and oxygen saturation is 98%.     Chief Complaint: Gout    64-year-old male with history of gout seen today for right knee pain.  He states knee began to throb 3 days ago.  He denies trauma.  Reports similar sensation to all past gouty flare-ups.        Constitution: Negative for chills and fever.   Cardiovascular:  Negative for sob on exertion.   Respiratory:  Negative for shortness of breath.    Gastrointestinal:  Negative for nausea and vomiting.   Musculoskeletal:  Positive for pain and joint pain. Negative for trauma.   Skin:  Negative for erythema and bruising.   Neurological:  Negative for dizziness, light-headedness, passing out, disorientation and altered mental status.   Psychiatric/Behavioral:  Negative for altered mental status, disorientation and confusion.       Objective:     Physical Exam   Constitutional: He is oriented to person, place, and time. He appears well-developed. He is cooperative.  Non-toxic appearance. He does not appear ill. No distress.   HENT:   Head: Normocephalic and atraumatic.   Ears:   Right Ear: External ear normal.   Left Ear: External ear normal.   Nose: Nose normal.   Mouth/Throat: Oropharynx is clear and moist and mucous membranes are normal. Mucous membranes are moist.   Eyes: Conjunctivae and lids are normal. No scleral icterus.   Neck: Trachea normal and phonation normal. Neck supple.   Cardiovascular: Normal rate, regular rhythm, normal heart sounds and normal pulses.   Pulmonary/Chest: Effort normal and breath sounds normal. No stridor. No respiratory distress.   Abdominal: Normal appearance.   Musculoskeletal:         General: No deformity.      Right knee: He exhibits swelling. He exhibits normal range of motion, no " effusion, no deformity and no erythema. Tenderness (Diffuse) found.        Legs:    Neurological: no focal deficit. He is alert and oriented to person, place, and time. He has normal strength and normal reflexes. No sensory deficit.   Skin: Skin is warm, dry, intact and not diaphoretic. Capillary refill takes 2 to 3 seconds. No erythema   Psychiatric: His speech is normal and behavior is normal. Judgment and thought content normal.   Nursing note and vitals reviewed.      Assessment:     1. Acute gout due to renal impairment involving right knee        Plan:       Acute gout due to renal impairment involving right knee  -     dexAMETHasone injection 8 mg  -     Discontinue: methylPREDNISolone (MEDROL DOSEPACK) 4 mg tablet; use as directed  Dispense: 21 each; Refill: 0  -     colchicine (COLCRYS) 0.6 mg tablet; Take 2 tablets (1.2 mg total) by mouth once daily. Take two tablets once. May take one table an hour later if pain persists. for 1 day  Dispense: 3 tablet; Refill: 0      The physical exam findings were discussed with the patient and all questions answered. We discussed symptom monitoring, conservative care methods, medication use, and follow up orders. he verbalized understanding and agreement with the plan of care.

## 2024-12-10 ENCOUNTER — TELEPHONE (OUTPATIENT)
Dept: HEPATOLOGY | Facility: CLINIC | Age: 64
End: 2024-12-10
Payer: MEDICARE

## 2024-12-10 DIAGNOSIS — R76.8 HEPATITIS B CORE ANTIBODY POSITIVE: ICD-10-CM

## 2024-12-10 DIAGNOSIS — K74.60 CIRRHOSIS OF LIVER WITHOUT ASCITES, UNSPECIFIED HEPATIC CIRRHOSIS TYPE: Primary | ICD-10-CM

## 2024-12-10 NOTE — TELEPHONE ENCOUNTER
Pt was called and labs were schld with him.    [place]    ----- Message from Janeth Cuevas NP sent at 12/10/2024  1:03 PM CST -----  Please schedule all labs ordered by me prior to visit next month, thanks.

## 2025-01-03 ENCOUNTER — LAB VISIT (OUTPATIENT)
Dept: LAB | Facility: HOSPITAL | Age: 65
End: 2025-01-03
Attending: NURSE PRACTITIONER
Payer: MEDICARE

## 2025-01-03 DIAGNOSIS — K74.60 CIRRHOSIS OF LIVER WITHOUT ASCITES, UNSPECIFIED HEPATIC CIRRHOSIS TYPE: ICD-10-CM

## 2025-01-03 DIAGNOSIS — R76.8 HEPATITIS B CORE ANTIBODY POSITIVE: ICD-10-CM

## 2025-01-03 LAB
ALBUMIN SERPL BCP-MCNC: 4.1 G/DL (ref 3.5–5.2)
ALP SERPL-CCNC: 109 U/L (ref 55–135)
ALT SERPL W/O P-5'-P-CCNC: 27 U/L (ref 10–44)
ANION GAP SERPL CALC-SCNC: 8 MMOL/L (ref 8–16)
AST SERPL-CCNC: 20 U/L (ref 10–40)
BILIRUB SERPL-MCNC: 0.4 MG/DL (ref 0.1–1)
BUN SERPL-MCNC: 23 MG/DL (ref 8–23)
CALCIUM SERPL-MCNC: 9.7 MG/DL (ref 8.7–10.5)
CHLORIDE SERPL-SCNC: 107 MMOL/L (ref 95–110)
CO2 SERPL-SCNC: 25 MMOL/L (ref 23–29)
CREAT SERPL-MCNC: 1.5 MG/DL (ref 0.5–1.4)
ERYTHROCYTE [DISTWIDTH] IN BLOOD BY AUTOMATED COUNT: 13.7 % (ref 11.5–14.5)
EST. GFR  (NO RACE VARIABLE): 51.3 ML/MIN/1.73 M^2
FERRITIN SERPL-MCNC: 76.8 NG/ML (ref 20–300)
GLUCOSE SERPL-MCNC: 113 MG/DL (ref 70–110)
HCT VFR BLD AUTO: 41.2 % (ref 40–54)
HGB BLD-MCNC: 13.9 G/DL (ref 14–18)
INR PPP: 1 (ref 0.8–1.2)
IRON SERPL-MCNC: 74 UG/DL (ref 45–160)
MCH RBC QN AUTO: 31.7 PG (ref 27–31)
MCHC RBC AUTO-ENTMCNC: 33.7 G/DL (ref 32–36)
MCV RBC AUTO: 94 FL (ref 82–98)
PLATELET # BLD AUTO: 260 K/UL (ref 150–450)
PMV BLD AUTO: 10.4 FL (ref 9.2–12.9)
POTASSIUM SERPL-SCNC: 4 MMOL/L (ref 3.5–5.1)
PROT SERPL-MCNC: 7.7 G/DL (ref 6–8.4)
PROTHROMBIN TIME: 10.8 SEC (ref 9–12.5)
RBC # BLD AUTO: 4.39 M/UL (ref 4.6–6.2)
SATURATED IRON: 24 % (ref 20–50)
SODIUM SERPL-SCNC: 140 MMOL/L (ref 136–145)
TOTAL IRON BINDING CAPACITY: 307 UG/DL (ref 250–450)
TRANSFERRIN SERPL-MCNC: 219 MG/DL (ref 200–375)
WBC # BLD AUTO: 11.2 K/UL (ref 3.9–12.7)

## 2025-01-03 PROCEDURE — 87522 HEPATITIS C REVRS TRNSCRPJ: CPT | Performed by: NURSE PRACTITIONER

## 2025-01-03 PROCEDURE — 83540 ASSAY OF IRON: CPT | Performed by: NURSE PRACTITIONER

## 2025-01-03 PROCEDURE — 82728 ASSAY OF FERRITIN: CPT | Performed by: NURSE PRACTITIONER

## 2025-01-03 PROCEDURE — 87340 HEPATITIS B SURFACE AG IA: CPT | Performed by: NURSE PRACTITIONER

## 2025-01-03 PROCEDURE — 86704 HEP B CORE ANTIBODY TOTAL: CPT | Performed by: NURSE PRACTITIONER

## 2025-01-03 PROCEDURE — 82105 ALPHA-FETOPROTEIN SERUM: CPT | Performed by: NURSE PRACTITIONER

## 2025-01-03 PROCEDURE — 80053 COMPREHEN METABOLIC PANEL: CPT | Performed by: NURSE PRACTITIONER

## 2025-01-03 PROCEDURE — 87517 HEPATITIS B DNA QUANT: CPT | Performed by: NURSE PRACTITIONER

## 2025-01-03 PROCEDURE — 85610 PROTHROMBIN TIME: CPT | Performed by: NURSE PRACTITIONER

## 2025-01-03 PROCEDURE — 85027 COMPLETE CBC AUTOMATED: CPT | Performed by: NURSE PRACTITIONER

## 2025-01-04 LAB
AFP-TM SERPL-MCNC: 2.9 NG/ML (ref 0–8.4)
HBV CORE AB SERPL QL IA: POSITIVE
HBV SURFACE AG SERPL QL IA: NEGATIVE
MISCELLANEOUS TEST NAME: NORMAL
REFERENCE LAB: NORMAL
SPECIMEN TYPE: NORMAL

## 2025-01-05 LAB
HBV DNA SERPL NAA+PROBE-ACNC: NORMAL IU/ML
HBV DNA SERPL NAA+PROBE-LOG IU: NORMAL LOG10 IU/ML
REF LAB TEST REF RANGE: NORMAL

## 2025-01-10 ENCOUNTER — OFFICE VISIT (OUTPATIENT)
Facility: CLINIC | Age: 65
End: 2025-01-10
Payer: MEDICARE

## 2025-01-10 VITALS — HEIGHT: 70 IN | BODY MASS INDEX: 36.2 KG/M2 | WEIGHT: 252.88 LBS

## 2025-01-10 DIAGNOSIS — E11.22 TYPE 2 DIABETES MELLITUS WITH STAGE 3B CHRONIC KIDNEY DISEASE, WITHOUT LONG-TERM CURRENT USE OF INSULIN: ICD-10-CM

## 2025-01-10 DIAGNOSIS — N18.32 TYPE 2 DIABETES MELLITUS WITH STAGE 3B CHRONIC KIDNEY DISEASE, WITHOUT LONG-TERM CURRENT USE OF INSULIN: ICD-10-CM

## 2025-01-10 DIAGNOSIS — R76.8 HEPATITIS B CORE ANTIBODY POSITIVE: ICD-10-CM

## 2025-01-10 DIAGNOSIS — E78.5 HYPERLIPIDEMIA, UNSPECIFIED HYPERLIPIDEMIA TYPE: ICD-10-CM

## 2025-01-10 DIAGNOSIS — E66.01 CLASS 2 SEVERE OBESITY WITH SERIOUS COMORBIDITY AND BODY MASS INDEX (BMI) OF 36.0 TO 36.9 IN ADULT, UNSPECIFIED OBESITY TYPE: ICD-10-CM

## 2025-01-10 DIAGNOSIS — E66.812 CLASS 2 SEVERE OBESITY WITH SERIOUS COMORBIDITY AND BODY MASS INDEX (BMI) OF 36.0 TO 36.9 IN ADULT, UNSPECIFIED OBESITY TYPE: ICD-10-CM

## 2025-01-10 DIAGNOSIS — K76.0 METABOLIC DYSFUNCTION-ASSOCIATED STEATOTIC LIVER DISEASE (MASLD): Primary | ICD-10-CM

## 2025-01-10 DIAGNOSIS — K74.60 CIRRHOSIS OF LIVER WITHOUT ASCITES, UNSPECIFIED HEPATIC CIRRHOSIS TYPE: ICD-10-CM

## 2025-01-10 PROCEDURE — 99999 PR PBB SHADOW E&M-EST. PATIENT-LVL III: CPT | Mod: PBBFAC,,, | Performed by: NURSE PRACTITIONER

## 2025-01-10 PROCEDURE — 99213 OFFICE O/P EST LOW 20 MIN: CPT | Mod: PBBFAC,PN | Performed by: NURSE PRACTITIONER

## 2025-01-10 NOTE — PATIENT INSTRUCTIONS
Continue Vemlidy   Labs, ultrasound, and visit in May   Referral to medical weight loss             CIRRHOSIS EDUCATION:  This is a helpful web site about cirrhosis: https://cirrhosiscare.ca/     Because you have cirrhosis, it is extremely important to obtain blood tests and an ultrasound every 6 months to screen for liver cancer (you are at risk for developing liver cancer due to increased scar tissue in the liver).     Signs and symptoms of worsening liver disease include jaundice (yellow skin/eyes), fluid in the abdomen (ascites), and confusion/disorientation/slowed thought processes due to hepatic encephalopathy (toxins building up when the liver is not working properly). You should seek medical attention if any of these things occur. Also, possible bleeding from esophageal varices (blood vessels in the stomach and foodpipe that can burst and cause bleeding). If you have symptoms of vomiting blood, blood in your stool, maroon or black stools, or coffee ground vomit, you should go to the emergency room immediately.     Cirrhosis can increase the risk of impaired liver function or liver failure; however, we will watch your liver function score (MELD score) closely with each clinic visit. A normal MELD score is 6, highest is 40. The MELD score helps to determine when we may need to consider evaluation for liver transplant.     Counseling  -- Strict abstinence from alcohol (includes beer, wine, and/or liquor)  -- Avoid non-steroidal anti-inflammatory drugs (NSAIDs) such as ibuprofen (Motrin, Advil), naproxen (Naprosyn, Aleve), meloxicam (Mobic)   -- Tylenol/acetaminophen is OKAY and should be used as needed for pain, no more than 2000 mg per day  -- Avoid raw shellfish due to the risk of Vibrio vulnificus infection  -- Low salt/sodium diet, less than 2000 mg per day   -- High protein diet to prevent muscle mass loss. Can drink protein shakes (Premier Protein is a great option because it is very high protein and low  sugar). A bedtime snack with protein can also be helpful. Example: peanut butter sandwich/crackers  -- Periodic upper endoscopy (EGD) to screen for varices (enlarged blood vessels) in the esophagus and stomach which can increase risk of bleeding  -- Increased risk of liver cancer associated with cirrhosis; therefore, continued screening with ultrasound (or CT / MRI) and AFP tumor marker every 6 months is recommended.

## 2025-01-10 NOTE — PROGRESS NOTES
[facility] - Established Patient    Last Clinic Visit: 10/30/24    Chief Complaint: Follow-up for positive HBV core Ab and fatty liver       HISTORY     This is a 64 y.o. 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      Labs from 23 and 1/3/25 showed a positive hepatitis B core antibody. Hepatitis B surface Ag negative and DNA undetected, indicating no current/chronic infection.     HCV Ab also noted to be positive though RNA negative, indicating no current infection.      No known history of hepatitis or prior exposure.  No family history of hepatitis B.  He has a h/o drug use though >40 years ago. Has multiple tattoos that are many years old. He had 1 friend that had hepatitis and  from liver failure.   History of IVIG - no    Currently followed by Rheumatology for psoriasis.  We started Vemldiy for HBV prophylaxis given moderate risk of reactivation with cosentyx. He has since been switched to Skyrizi.     Liver enzymes noted to be mildly elevated for the last year, 30-60s. Abd US showed hepatomegaly and hepatic steatosis.    His risk factors for MASLD include-  BMI >35  HTN, HLD, DM    No alcohol use.    Fibrosis staging:  Fibroscan 10/2024 = F4 (kPa 19.8), S3  MRI elastography 2024 = F3-4 (kPa 4.55)  *MRI with irregular liver contour, suggestive of cirrhosis.     Interval history:   Remains on Skyrizi and prednisone 10 mg for psoriasis.     Doing well with Vemlidy, no issues.  HBV surface Ag remains negative and DNA undetected.     Updates on risk factors for MASLD:  Weight -- Body mass index is 36.28 kg/m².         Weight is relatively stable, interested in bariatric medicine               Dyslipidemia -- mildly elevated, on atorvastatin                              Insulin resistance / diabetes -- HgbA1c 5.6            Alcohol use -- none    No symptoms of hepatic decompensation including jaundice,  ascites, cognitive problems to suggest hepatic encephalopathy, or GI bleeding.     Health Maintenance:  - Most recent imaging: MRI 11/2024 without focal hepatic lesion; AFP 2.9   - Hepatitis A vaccination: +immunity          Past medical history, surgical history, problem list, family history, social history, allergies: Reviewed and updated in the appropriate section of the electronic medical record.      Current Outpatient Medications   Medication Sig Dispense Refill    allopurinoL (ZYLOPRIM) 100 MG tablet Take 1 tablet (100 mg total) by mouth once daily. 30 tablet 1    amLODIPine (NORVASC) 5 MG tablet Take 1 tablet (5 mg total) by mouth once daily. 90 tablet 0    atorvastatin (LIPITOR) 10 MG tablet Take 1 tablet (10 mg total) by mouth once daily. 90 tablet 1    cetirizine (ZYRTEC) 10 MG tablet Take 10 mg by mouth every morning.      clobetasol 0.05% (TEMOVATE) 0.05 % Oint Apply 1 g topically 2 (two) times daily.      colchicine (COLCRYS) 0.6 mg tablet Take 2 tablets (1.2 mg total) by mouth once daily. Take two tablets once. May take one table an hour later if pain persists. for 1 day 3 tablet 0    diclofenac sodium (VOLTAREN ARTHRITIS PAIN) 1 % Gel Apply 2 g topically 4 (four) times daily. 150 g 3    predniSONE (DELTASONE) 5 MG tablet Take 2 tablets (10 mg total) by mouth once daily.      risankizumab-rzaa (SKYRIZI) 150 mg/mL PnIj 150 mg at weeks 0, 4, and then every 12 weeks thereafter. 4 mL 3    tenofovir alafenamide (VEMLIDY) 25 mg Tab Take 1 tablet (25 mg total) by mouth once daily. 30 tablet 11     No current facility-administered medications for this visit.     Medication list reviewed and updated.      Review of Systems - as per HPI  Constitutional: Negative for unexpected weight change.   Respiratory: Negative for shortness of breath.    Cardiovascular: Negative for leg swelling.  Gastrointestinal: Negative for abdominal distention or abdominal pain. Negative for melena or hematemesis.  Musculoskeletal:  Negative for myalgias.    Skin: Negative for jaundice or itching.  Neurological: Negative for confusion or slowed mentation. Negative for tremors.   Hematological: Does not bruise/bleed easily.       Physical Exam   Constitutional: No distress. Alert and oriented.  Eyes: No scleral icterus.   Pulmonary/Chest: Respiratory effort normal. No respiratory distress.   Abdominal: No distension, no ascites appreciated.   Extremities: No edema.   Neurological: No tremor.   Skin: No jaundice.   Psychiatric: Normal mood and affect. Speech, behavior, and thought content normal.       LABS & DIAGNOSTIC STUDIES     I have personally reviewed pertinent laboratory findings:    Lab Results   Component Value Date    ALT 27 01/03/2025    AST 20 01/03/2025    ALKPHOS 109 01/03/2025    BILITOT 0.4 01/03/2025    ALBUMIN 4.1 01/03/2025    INR 1.0 01/03/2025       Lab Results   Component Value Date    WBC 11.20 01/03/2025    HGB 13.9 (L) 01/03/2025    HCT 41.2 01/03/2025    MCV 94 01/03/2025     01/03/2025       Lab Results   Component Value Date     01/03/2025    K 4.0 01/03/2025    BUN 23 01/03/2025    CREATININE 1.5 (H) 01/03/2025       Lab Results   Component Value Date    FERRITIN 76.8 01/03/2025    FESATURATED 24 01/03/2025    HEPBSAG Negative 01/03/2025    HEPBCAB Positive (A) 01/03/2025    HEPCAB Reactive (A) 12/27/2023    HEPBDNA HBV DNA not detected 01/03/2025       Lab Results   Component Value Date    AFP 2.9 01/03/2025         I have personally reviewed the following result reports:  Abdominal US - 4/30/24  MRI - 11/13/24      ASSESSMENT & PLAN     64 y.o. male with:    1. Metabolic dysfunction-associated steatotic liver disease (MASLD)    2. Class 2 severe obesity with serious comorbidity and body mass index (BMI) of 36.0 to 36.9 in adult, unspecified obesity type    3. Cirrhosis of liver without ascites, unspecified hepatic cirrhosis type    4. Hepatitis B core antibody positive    5. Type 2 diabetes mellitus  with stage 3b chronic kidney disease, without long-term current use of insulin    6. Hyperlipidemia, unspecified hyperlipidemia type        MELD 3.0: 11 at 1/3/2025 11:54 AM  MELD-Na: 10 at 1/3/2025 11:54 AM  Calculated from:  Serum Creatinine: 1.5 mg/dL at 1/3/2025 11:53 AM  Serum Sodium: 140 mmol/L (Using max of 137 mmol/L) at 1/3/2025 11:53 AM  Total Bilirubin: 0.4 mg/dL (Using min of 1 mg/dL) at 1/3/2025 11:53 AM  Serum Albumin: 4.1 g/dL (Using max of 3.5 g/dL) at 1/3/2025 11:53 AM  INR(ratio): 1 at 1/3/2025 11:54 AM  Age at listing (hypothetical): 64 years  Sex: Male at 1/3/2025 11:54 AM    MRI elastography and Fibroscan both suggest advanced fibrosis/cirrhosis, presumed due to MASH. MRI with irregular liver contour. Liver function is normal and no s/s hepatic decompensation.     HBV core Ab positive- on Vemlidy for prophylaxis given moderate risk of HBV reactivation with Skyrizi (1-10%).     Recommendations:  MELD <15, no indication for liver transplant evaluation at this time. Reassured that liver function remains stable. Monitor LFTs every 6 months (include HBV serologies)  Continue HCC screening every 6 months with ultrasound and AFP, next due 5/2025  Per AASLD guidelines, can hold off on varices screening given Fibroscan/MRI kPa scores and normal platelet count. No findings of portal HTN at this time. Can repeat Fibroscan in 1 year.   Continue Vemlidy. He will reach out if med regimen changes at any time so that need for prophylaxis can be reassessed   Weight loss efforts with Mediterranean diet and moderate exercise (as tolerated). Will refer to bariatric medicine to see if he may be eligible for GLPs.   Maintain good control of blood pressure, cholesterol, and blood sugar    Cirrhosis education and HCM reviewed.       Orders Placed This Encounter   Procedures    US Abdomen Limited    CBC Without Differential    Comprehensive Metabolic Panel    AFP Tumor Marker    Protime-INR    Hepatitis B Surface  Antigen    HEPATITIS B VIRAL DNA, QUANTITATIVE    Ambulatory referral/consult to Bariatric/Obesity Medicine       Return to clinic in 4 months with labs/US.       Thank you for allowing me to participate in the care of SHOSHANA HerbertP-C  Hepatology        Duration of encounter: 45 min  This includes face to face time and non-face to face time preparing to see the patient (eg, review of tests), obtaining and/or reviewing separately obtained history, documenting clinical information in the electronic or other health record, independently interpreting results and communicating results to the patient/family/caregiver, or Care coordination.

## 2025-01-15 ENCOUNTER — TELEPHONE (OUTPATIENT)
Dept: BARIATRICS | Facility: CLINIC | Age: 65
End: 2025-01-15
Payer: MEDICARE

## 2025-02-04 ENCOUNTER — TELEPHONE (OUTPATIENT)
Dept: OPHTHALMOLOGY | Facility: CLINIC | Age: 65
End: 2025-02-04
Payer: MEDICARE

## 2025-02-04 NOTE — TELEPHONE ENCOUNTER
Courtesy call to see if pt has vision insurance to use at upcoming eye exam- pt thinks he deos but unsure, will look into it and get back with us

## 2025-02-11 ENCOUNTER — OFFICE VISIT (OUTPATIENT)
Dept: OPTOMETRY | Facility: CLINIC | Age: 65
End: 2025-02-11
Payer: MEDICARE

## 2025-02-11 ENCOUNTER — TELEPHONE (OUTPATIENT)
Dept: RHEUMATOLOGY | Facility: CLINIC | Age: 65
End: 2025-02-11
Payer: MEDICARE

## 2025-02-11 DIAGNOSIS — H25.13 NUCLEAR SCLEROSIS, BILATERAL: ICD-10-CM

## 2025-02-11 DIAGNOSIS — E11.22 TYPE 2 DIABETES MELLITUS WITH STAGE 3B CHRONIC KIDNEY DISEASE, WITHOUT LONG-TERM CURRENT USE OF INSULIN: ICD-10-CM

## 2025-02-11 DIAGNOSIS — N18.32 TYPE 2 DIABETES MELLITUS WITH STAGE 3B CHRONIC KIDNEY DISEASE, WITHOUT LONG-TERM CURRENT USE OF INSULIN: ICD-10-CM

## 2025-02-11 DIAGNOSIS — H52.7 REFRACTIVE ERROR: ICD-10-CM

## 2025-02-11 DIAGNOSIS — E11.9 DIABETES MELLITUS TYPE 2 WITHOUT RETINOPATHY: Primary | ICD-10-CM

## 2025-02-11 PROCEDURE — 99999 PR PBB SHADOW E&M-EST. PATIENT-LVL III: CPT | Mod: PBBFAC,,, | Performed by: OPTOMETRIST

## 2025-02-11 PROCEDURE — 92015 DETERMINE REFRACTIVE STATE: CPT | Mod: ,,, | Performed by: OPTOMETRIST

## 2025-02-11 PROCEDURE — 92004 COMPRE OPH EXAM NEW PT 1/>: CPT | Mod: S$PBB,,, | Performed by: OPTOMETRIST

## 2025-02-11 PROCEDURE — 99213 OFFICE O/P EST LOW 20 MIN: CPT | Mod: PBBFAC,PO | Performed by: OPTOMETRIST

## 2025-02-11 NOTE — TELEPHONE ENCOUNTER
Patient states he is in a bond. He missed his deadline to renew the medicaid rx. His shot for Skyrizzi is due Friday. Wants to know if Dr. Neumann can help get him one dose of Skyrizzi. His insurance will be updated in March so he will only need one. Will inform Dr. Neumann.

## 2025-02-11 NOTE — PROGRESS NOTES
HPI    Pt here today for ocular health exam for DM.       States blurred vision at both near & distance.  Would like specs rx today, aware of fees.    Denies any headaches or eye pain.    Hemoglobin A1C       Date                     Value               Ref Range             Status                10/18/2024               5.6                   4.0 - 5.6 %           Final                 03/25/2024               5.5                   4.5 - 6.2 %           Final                 10/12/2022               7.0 (H)             4.8 - 5.6 %           Final              Pt does not check BSL.      (+) dry eyes  (-) gtts  (-) floaters or light flashes  Last edited by Rudolph Maciel, OD on 2/11/2025  2:12 PM.            Assessment /Plan     For exam results, see Encounter Report.    Diabetes mellitus type 2 without retinopathy    Type 2 diabetes mellitus with stage 3b chronic kidney disease, without long-term current use of insulin  -     Ambulatory referral/consult to Ophthalmology    Nuclear sclerosis, bilateral    Refractive error  -     Ambulatory referral/consult to Ophthalmology      1. Diabetes mellitus type 2 without retinopathy (Primary)  2. Type 2 diabetes mellitus with stage 3b chronic kidney disease, without long-term current use of insulin  Discussed possible ocular affects of uncontrolled blood sugar with patient. Recommended continued strong blood sugar control and continued care with PCP. Monitor yearly.     3. Nuclear sclerosis, bilateral  Mild to moderate OU, not VS  Discussed possible ocular affects of cataracts. Acceptable BCVA OU. Discussed treatment options. Surgery not recommended at this time. Monitor yearly.     4. Refractive error  Discussed possible refraction fee  Dispensed updated spectacle Rx. Discussed various spectacle lens options. Discussed adaptation period to new specs.   Ok to try [facility] [employer] trevon multifocus +1.00 for speedometer on motorcycle

## 2025-02-11 NOTE — TELEPHONE ENCOUNTER
----- Message from Julio sent at 2/11/2025  9:05 AM CST -----  Type: General Call Back     Name of Caller:pt   Reason the patient wants to speak with someone in regards to his skyrizzy injection pt states that he may miss it due to insurance issues. Wants to know if the office has some extras   Would the patient rather a call back or a response via [facility]? Call   Best Call Back Number:[phone]  Additional Information:

## 2025-02-18 ENCOUNTER — OFFICE VISIT (OUTPATIENT)
Dept: URGENT CARE | Facility: CLINIC | Age: 65
End: 2025-02-18
Payer: MEDICARE

## 2025-02-18 ENCOUNTER — OFFICE VISIT (OUTPATIENT)
Dept: BARIATRICS | Facility: CLINIC | Age: 65
End: 2025-02-18
Payer: MEDICARE

## 2025-02-18 VITALS
BODY MASS INDEX: 36.63 KG/M2 | DIASTOLIC BLOOD PRESSURE: 96 MMHG | WEIGHT: 255.88 LBS | RESPIRATION RATE: 16 BRPM | SYSTOLIC BLOOD PRESSURE: 179 MMHG | HEIGHT: 70 IN | HEART RATE: 67 BPM | TEMPERATURE: 98 F

## 2025-02-18 VITALS
SYSTOLIC BLOOD PRESSURE: 143 MMHG | BODY MASS INDEX: 36.51 KG/M2 | TEMPERATURE: 98 F | OXYGEN SATURATION: 97 % | HEART RATE: 61 BPM | HEIGHT: 70 IN | RESPIRATION RATE: 16 BRPM | WEIGHT: 255 LBS | DIASTOLIC BLOOD PRESSURE: 99 MMHG

## 2025-02-18 DIAGNOSIS — I12.9 TYPE 2 DIABETES MELLITUS WITH STAGE 3B CHRONIC KIDNEY DISEASE AND HYPERTENSION: Primary | ICD-10-CM

## 2025-02-18 DIAGNOSIS — N18.32 TYPE 2 DIABETES MELLITUS WITH STAGE 3B CHRONIC KIDNEY DISEASE AND HYPERTENSION: Primary | ICD-10-CM

## 2025-02-18 DIAGNOSIS — Z71.3 NUTRITIONAL COUNSELING: ICD-10-CM

## 2025-02-18 DIAGNOSIS — I10 PRIMARY HYPERTENSION: ICD-10-CM

## 2025-02-18 DIAGNOSIS — K76.0 METABOLIC DYSFUNCTION-ASSOCIATED STEATOTIC LIVER DISEASE (MASLD): ICD-10-CM

## 2025-02-18 DIAGNOSIS — M10.9 GOUTY ARTHRITIS: ICD-10-CM

## 2025-02-18 DIAGNOSIS — E11.22 TYPE 2 DIABETES MELLITUS WITH STAGE 3B CHRONIC KIDNEY DISEASE AND HYPERTENSION: Primary | ICD-10-CM

## 2025-02-18 DIAGNOSIS — M10.9 ACUTE GOUT OF RIGHT WRIST, UNSPECIFIED CAUSE: Primary | ICD-10-CM

## 2025-02-18 DIAGNOSIS — Z86.39 HISTORY OF DIABETES MELLITUS, TYPE II: ICD-10-CM

## 2025-02-18 DIAGNOSIS — E09.29 DRUG OR CHEMICAL INDUCED DIABETES MELLITUS WITH OTHER DIABETIC KIDNEY COMPLICATION: ICD-10-CM

## 2025-02-18 DIAGNOSIS — E66.01 MORBID OBESITY: ICD-10-CM

## 2025-02-18 DIAGNOSIS — I10 HYPERTENSION, UNSPECIFIED TYPE: ICD-10-CM

## 2025-02-18 LAB — GLUCOSE SERPL-MCNC: 88 MG/DL (ref 70–110)

## 2025-02-18 PROCEDURE — 99213 OFFICE O/P EST LOW 20 MIN: CPT | Mod: PBBFAC,PN | Performed by: NURSE PRACTITIONER

## 2025-02-18 RX ORDER — TIRZEPATIDE 5 MG/.5ML
5 INJECTION, SOLUTION SUBCUTANEOUS
Qty: 2 ML | Refills: 1 | Status: SHIPPED | OUTPATIENT
Start: 2025-03-21 | End: 2025-04-20

## 2025-02-18 RX ORDER — PREDNISONE 20 MG/1
20 TABLET ORAL 2 TIMES DAILY
Qty: 10 TABLET | Refills: 0 | Status: SHIPPED | OUTPATIENT
Start: 2025-02-18 | End: 2025-02-23

## 2025-02-18 RX ORDER — TIRZEPATIDE 2.5 MG/.5ML
2.5 INJECTION, SOLUTION SUBCUTANEOUS
Qty: 2 ML | Refills: 0 | Status: SHIPPED | OUTPATIENT
Start: 2025-02-18 | End: 2025-03-20

## 2025-02-18 RX ORDER — DEXAMETHASONE SODIUM PHOSPHATE 4 MG/ML
8 INJECTION, SOLUTION INTRA-ARTICULAR; INTRALESIONAL; INTRAMUSCULAR; INTRAVENOUS; SOFT TISSUE
Status: COMPLETED | OUTPATIENT
Start: 2025-02-18 | End: 2025-02-18

## 2025-02-18 RX ADMIN — DEXAMETHASONE SODIUM PHOSPHATE 8 MG: 4 INJECTION, SOLUTION INTRA-ARTICULAR; INTRALESIONAL; INTRAMUSCULAR; INTRAVENOUS; SOFT TISSUE at 03:02

## 2025-02-18 NOTE — PROGRESS NOTES
"Subjective:      Patient ID: Ben Lerma is a 65 y.o. male.    Vitals:  height is 5' 10" (1.778 m) and weight is 115.7 kg (255 lb). His oral temperature is 95.8 °F (35.4 °C) (abnormal). His blood pressure is 143/99 (abnormal) and his pulse is 61. His respiration is 16 and oxygen saturation is 94% (abnormal).     Chief Complaint: Hand Pain    In clinic complaint of right index finger MCP joint tenderness, and right wrist pain for a week.  Has a states has a history psoriatic arthritis, and gout.  Patient has a diabetic.  States it is not currently taking medications for gout.  On chart review patient has taken Mounjaro.  Also states you CKD, on allopurinol.  Denies use of alcohol, or high sodium meals.  But has been eating organ meats, and produce meats.  Range of motion limited to pain.  No trauma.  It is mildly erythematous, and warm.  States also can not take Tylenol due to fatty liver disease.    Hand Pain   His dominant hand is their right hand. The incident occurred 5 to 7 days ago. The pain is present in the right hand and right forearm. The quality of the pain is described as burning and aching. The pain radiates to the right arm. The pain is at a severity of 10/10. The pain is severe. The pain has been Constant since the incident. Pertinent negatives include no chest pain, muscle weakness, numbness or tingling. The symptoms are aggravated by movement and palpation. He has tried elevation and ice for the symptoms. The treatment provided no relief.       Constitution: Negative for fever.   HENT: Negative.     Neck: neck negative.   Cardiovascular: Negative.  Negative for chest pain.   Eyes: Negative.    Respiratory: Negative.     Gastrointestinal: Negative.    Endocrine: negative.   Genitourinary: Negative.    Musculoskeletal:  Positive for joint pain, joint swelling, abnormal ROM of joint, arthritis and gout. Negative for trauma.   Skin:  Positive for color change.   Neurological:  Negative for numbness. "   Hematologic/Lymphatic: Negative.    Psychiatric/Behavioral: Negative.        Objective:     Physical Exam   Constitutional: He is oriented to person, place, and time. He is cooperative.  Non-toxic appearance. He does not appear ill. No distress. obesity  HENT:   Head: Normocephalic and atraumatic.   Ears:   Right Ear: External ear normal.   Left Ear: External ear normal.   Nose: Nose normal.   Mouth/Throat: Uvula is midline, oropharynx is clear and moist and mucous membranes are normal. Mucous membranes are moist. Oropharynx is clear.   Eyes: Conjunctivae and lids are normal. Pupils are equal, round, and reactive to light. Extraocular movement intact   Neck: Trachea normal and phonation normal. Neck supple.   Cardiovascular: Normal rate, regular rhythm, normal heart sounds and normal pulses.   Pulmonary/Chest: Effort normal and breath sounds normal.   Abdominal: Normal appearance.   Musculoskeletal:      Right hand: Right index finger: Exhibits decreased ROM, ecchymosis, tenderness and swelling. There is tenderness of the MCP joint.        Hands:    Neurological: no focal deficit. He is alert, oriented to person, place, and time and at baseline. He has normal motor skills, normal sensation and intact cranial nerves (2-12). Gait and coordination normal. GCS eye subscore is 4. GCS verbal subscore is 5. GCS motor subscore is 6.   Skin: Skin is warm, dry and not diaphoretic. Capillary refill takes 2 to 3 seconds.   Psychiatric: He experiences Normal attention and Normal perception. His speech is normal and behavior is normal. Mood, judgment and thought content normal.   Nursing note and vitals reviewed.      Assessment:     1. Acute gout of right wrist, unspecified cause    2. Gouty arthritis    3. History of diabetes mellitus, type II    4. 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 for 5 days  Dispense: 10 tablet; Refill: 0    Gouty arthritis  -     dexAMETHasone injection 8 mg  -     predniSONE (DELTASONE) 20 MG tablet; Take 1 tablet (20 mg total) by mouth 2 (two) times daily. 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  History of gout.  States he has had gout in his wrist past.  Can not take Tylenol, or ibuprofen due to chronic medical problems.  We will provide him with steroids to treat pain.  Specific return, and follow up instructions were discussed.  Shared medical decision-making declined x-rays.  Differential considerations fracture, dislocation, zoster

## 2025-02-18 NOTE — PATIENT INSTRUCTIONS
Begin prednisone tomorrow.  Avoid salt, alcohol, organ meats, and foods that are high purine.    You can supplement with limit juice, and cherry juice.

## 2025-02-18 NOTE — PROGRESS NOTES
Initial Consult    Chief Complaint   Patient presents with    Consult    Obesity    Nutrition Counseling       History of Present Illness:  Patient is a 65 y.o. male who is referred for evaluation of surgical or medical treatment of morbid obesity. His Body mass index is 36.71 kg/m². He has known comorbidities of dyslipidemia, gout, hypertension, and MASLD . He has attended the bariatric seminar and is most interested in Medical Weight Loss.      Past attempts at weight loss include:   Unsupervised: low carb   Supervised:  exercise counseling  Diet pills: n/a  Exercise attempts: walking    Weight history:   At current weight:  2 years  Obese for 5 years.  More than 35 pounds overweight for 5 years.  More than 100 pounds overweight for 2 years.  Started dieting at 49 years old.  Maximum weight reached: 255 lbs  Most weight lost was 15 lbs through low sugar for 4 months.  He describes His eating habits as snacking, binge.    CASSI screening: n/a    Reflux screening: intermittent with foods      Past Medical History:   Diagnosis Date    Allergy     Arthritis     Gout, unspecified     Hypertension     Metabolic dysfunction-associated steatotic liver disease (MASLD)     Mixed hyperlipidemia     Psoriasis     Stage 3b chronic kidney disease (CKD)     Type 2 diabetes mellitus with stage 3b chronic kidney disease and hypertension      Past Surgical History:   Procedure Laterality Date    DENTAL SURGERY       Family History   Problem Relation Name Age of Onset    Colon cancer Mother      Lupus Mother      Diabetes Mother      Obesity Mother      Hypertension Father       Social History[1]     Review of patient's allergies indicates:   Allergen Reactions    Lisinopril Swelling     Angioedema        Current Medications[2]      Chart review:  Primary Care Physician: none      Lab review:  Most Recent Data:  CBC:   Lab Results   Component Value Date    WBC 11.20 01/03/2025    HGB 13.9 (L) 01/03/2025    HCT 41.2 01/03/2025    PLT  "260 01/03/2025    MCV 94 01/03/2025    RDW 13.7 01/03/2025       BMP:   Lab Results   Component Value Date     01/03/2025    K 4.0 01/03/2025     01/03/2025    CO2 25 01/03/2025    BUN 23 01/03/2025    CREATININE 1.5 (H) 01/03/2025     (H) 01/03/2025    CALCIUM 9.7 01/03/2025    PHOS 3.2 07/08/2024     LFTs:   Lab Results   Component Value Date    PROT 7.7 01/03/2025    ALBUMIN 4.1 01/03/2025    BILITOT 0.4 01/03/2025    AST 20 01/03/2025    ALKPHOS 109 01/03/2025    ALT 27 01/03/2025     Coags:   Lab Results   Component Value Date    INR 1.0 01/03/2025     FLP:   Lab Results   Component Value Date    CHOL 216 (H) 10/18/2024    HDL 47 10/18/2024    LDLCALC 143.4 10/18/2024    TRIG 128 10/18/2024    CHOLHDL 21.8 10/18/2024     DM:   Lab Results   Component Value Date    HGBA1C 5.6 10/18/2024    HGBA1C 5.5 03/25/2024    HGBA1C 7.0 (H) 10/12/2022    LDLCALC 143.4 10/18/2024    CREATININE 1.5 (H) 01/03/2025     Thyroid:   Lab Results   Component Value Date    TSH 1.220 10/12/2022     Anemia:   Lab Results   Component Value Date    IRON 74 01/03/2025    TIBC 307 01/03/2025    FERRITIN 76.8 01/03/2025     Cardiac: No results found for: "TROPONINI", "CKTOTAL", "CKMB", "BNP"  Urinalysis:   Lab Results   Component Value Date    COLORU Yellow 03/25/2024    SPECGRAV 1.010 03/25/2024    NITRITE Negative 03/25/2024    KETONESU Negative 03/25/2024    UROBILINOGEN Negative 03/25/2024    WBCUA 8 (H) 03/25/2024         Radiology review:      Other Results:  EKG (my interpretation): there are no previous tracings available for comparison, sinus tachycardia.        Review of Systems:  Review of Systems   Constitutional:  Positive for appetite change, fatigue and unexpected weight change.   Musculoskeletal:  Positive for arthralgias, back pain, gait problem and joint swelling.        Hx of gout   Skin:  Positive for rash (HX of psoriasis).   All other systems reviewed and are negative.      Diet Education Discussed: " "Recommend high protein, low carb meals- mainly meats and vegetables.  Wake 7 am  Breakfast:  eggs/omelet, skip 2x/wk  Snack: have large cheese/cracker/meat tray eat on all day  Lunch:  skip  Dinner after 6p:  main meal of the day, chicken/pork and some fried foods, potatoes with meal, Special K cereal with 2% milk  Does not like Cooked Vegetables, prefers raw- no squash, greens, or salad dressing/maldonado  Water: none  Coffee with milk/powder milk- drinks all day  Wife cooks/shops    MVI/Minerals/Herbs:   Fish oil  Marijuana daily    Exercise: Recommend cardiovascular exercise, get HR over 100 for 20 minutes 3 times per week  Limited d/t gout       Physical:     Vital Signs (Most Recent)  Temp: 97.9 °F (36.6 °C) (02/18/25 1408)  Pulse: 67 (02/18/25 1408)  Resp: 16 (02/18/25 1408)  BP: (!) 179/96 (02/18/25 1408)  5' 10" (1.778 m)  116 kg (255 lb 13.5 oz)     Body comp:  Fat Percent:  46.7 %  Fat Mass:  117.2 lb  FFM:  133.8 lb  TBW: 107.4 lb  TBW %:  42.8 %  BMR: 1876 kcal    Wt Readings from Last 5 Encounters:   02/18/25 116 kg (255 lb 13.5 oz)   01/10/25 114.7 kg (252 lb 13.9 oz)   11/19/24 112 kg (247 lb)   10/30/24 116 kg (255 lb 13.5 oz)   10/18/24 113.7 kg (250 lb 10.6 oz)         Physical Exam:  Physical Exam  Vitals and nursing note reviewed.   Constitutional:       General: He is not in acute distress.     Appearance: Normal appearance. He is obese. He is not ill-appearing or diaphoretic.   HENT:      Head: Normocephalic and atraumatic.      Right Ear: External ear normal.      Left Ear: External ear normal.      Nose: Nose normal. No congestion or rhinorrhea.      Mouth/Throat:      Mouth: Mucous membranes are moist.      Pharynx: Oropharynx is clear.   Eyes:      General: No scleral icterus.        Right eye: No discharge.         Left eye: No discharge.      Conjunctiva/sclera: Conjunctivae normal.   Cardiovascular:      Rate and Rhythm: Normal rate.      Pulses: Normal pulses.      Heart sounds: No murmur " heard.  Pulmonary:      Effort: Pulmonary effort is normal. No respiratory distress.   Chest:      Chest wall: No tenderness.   Musculoskeletal:         General: Swelling and tenderness present. No deformity or signs of injury.      Cervical back: Normal range of motion and neck supple. No rigidity or tenderness.      Right lower leg: No edema.      Left lower leg: No edema.   Skin:     General: Skin is warm and dry.      Capillary Refill: Capillary refill takes less than 2 seconds.      Coloration: Skin is not jaundiced or pale.      Findings: No bruising, erythema, lesion or rash.   Neurological:      General: No focal deficit present.      Mental Status: He is alert and oriented to person, place, and time. Mental status is at baseline.      Motor: No weakness.      Coordination: Coordination normal.      Gait: Gait normal.   Psychiatric:         Mood and Affect: Mood normal.         Thought Content: Thought content normal.         Judgment: Judgment normal.     ASSESSMENT/PLAN:        1. Type 2 diabetes mellitus with stage 3b chronic kidney disease and hypertension        2. Morbid obesity        3. Primary hypertension        4. Nutritional counseling        5. Metabolic dysfunction-associated steatotic liver disease (MASLD)        6. Hypertension, unspecified type            Plan:  Ben Lerma has morbid obesity as their Body mass index is 36.71 kg/m². He would benefit from weight loss and has chosen Medical Weight Loss as the preferred method. He understands that this is a tool and lifestyle change will be necessary to keep weight off.     Behavorial/Counseling Plan for patient:  Falls reviewed with patient  Continue prescribed home medications  Tanita Body Composition Scale results reviewed with patient  Discussed muscle vs fat loss  Do not exceed 1700 calories/day  I recommend the following therapeutic lifestyle changes:  Diet:   Transition to a low salt, primarily plant-based diet which  emphasizes:  Increase fiber with vegetables, whole grains, legumes   Increase lean protein by eating beans, legumes, fish, poultry, eggs, bison  Good dairy choices for lean protein include greek yogurt (low sugar options) and cottage cheese  Replacing butter with healthy fats, such as olive oil and nuts  Using herbs and spices instead of salt to flavor foods   Limiting red meat to no more than a few times a month   Limit added sugars (sodas, fruit juices, fancy coffee drinks)  Limit processed foods   Do not skip meals  Ensure 60 gm/protein per day at minimum  Focus on small, frequesnt meals with eating high protein, low carb  Clear, protein powder added to water, typically 20 gm protein- brands like Isopure Fusion, Oath, Seeq  Unflavored protein, Isopure, can add 25g to foods   Decrease coffee and increase water  With dinner, can have meat, fruit and small carb.   Can have 3 small cheese/meat tray but no crackers  2. Exercise:   Initiation of a graded aerobic exercise plan (goal 30-45 minutes per day 4-5 times per week)  Patient encouraged to participate in low intensity strength exercising and if unfamiliar with strength and conditioning training, I recommend joining a gym with access to a  to further instruct the patient. Increase activity  Remain consistent with exercise, including cardio and weights  3. Vitamins:   Start MVI            Medical Weight Loss  PO vs Injectables  PO  Topamax-   IR vs ER Amphetamines- Phentermine, Phendimetrazine  Tenuate  Contrave  Injectables  Discussed Wegovy vs Ozempic  Discussed Zepbound vs Mounjaro  Discussed muscle vs fat loss on GLP1  Will attempt for T2DM with stage 3b kidney failure and hypertension for Mounjaro 2.5 mg  Will increase next month to 5mg for 2 months            [1]   Social History  Tobacco Use    Smoking status: Former     Types: Cigarettes   Substance Use Topics    Alcohol use: Not Currently     Comment: very rarely 1 x yr    Drug use: Yes      Types: Marijuana   [2]   Current Outpatient Medications   Medication Sig Dispense Refill    allopurinoL (ZYLOPRIM) 100 MG tablet Take 1 tablet (100 mg total) by mouth once daily. 30 tablet 1    amLODIPine (NORVASC) 5 MG tablet Take 1 tablet (5 mg total) by mouth once daily. 90 tablet 0    atorvastatin (LIPITOR) 10 MG tablet Take 1 tablet (10 mg total) by mouth once daily. 90 tablet 1    cetirizine (ZYRTEC) 10 MG tablet Take 10 mg by mouth every morning.      clobetasol 0.05% (TEMOVATE) 0.05 % Oint Apply 1 g topically 2 (two) times daily.      colchicine (COLCRYS) 0.6 mg tablet Take 2 tablets (1.2 mg total) by mouth once daily. Take two tablets once. May take one table an hour later if pain persists. for 1 day 3 tablet 0    diclofenac sodium (VOLTAREN ARTHRITIS PAIN) 1 % Gel Apply 2 g topically 4 (four) times daily. 150 g 3    risankizumab-rzaa (SKYRIZI) 150 mg/mL PnIj 150 mg at weeks 0, 4, and then every 12 weeks thereafter. 4 mL 3    tenofovir alafenamide (VEMLIDY) 25 mg Tab Take 1 tablet (25 mg total) by mouth once daily. 30 tablet 11     No current facility-administered medications for this visit.

## 2025-03-10 DIAGNOSIS — I12.9 TYPE 2 DIABETES MELLITUS WITH STAGE 3B CHRONIC KIDNEY DISEASE AND HYPERTENSION: ICD-10-CM

## 2025-03-10 DIAGNOSIS — N18.32 TYPE 2 DIABETES MELLITUS WITH STAGE 3B CHRONIC KIDNEY DISEASE AND HYPERTENSION: ICD-10-CM

## 2025-03-10 DIAGNOSIS — E11.22 TYPE 2 DIABETES MELLITUS WITH STAGE 3B CHRONIC KIDNEY DISEASE AND HYPERTENSION: ICD-10-CM

## 2025-03-10 DIAGNOSIS — K76.0 METABOLIC DYSFUNCTION-ASSOCIATED STEATOTIC LIVER DISEASE (MASLD): ICD-10-CM

## 2025-03-10 DIAGNOSIS — E66.01 MORBID OBESITY: ICD-10-CM

## 2025-03-10 RX ORDER — TIRZEPATIDE 2.5 MG/.5ML
2.5 INJECTION, SOLUTION SUBCUTANEOUS
Qty: 2 ML | Refills: 0 | Status: SHIPPED | OUTPATIENT
Start: 2025-03-10 | End: 2025-04-09

## 2025-03-10 RX ORDER — TIRZEPATIDE 5 MG/.5ML
5 INJECTION, SOLUTION SUBCUTANEOUS
Qty: 2 ML | Refills: 0 | Status: SHIPPED | OUTPATIENT
Start: 2025-03-21 | End: 2025-04-20

## 2025-03-10 NOTE — TELEPHONE ENCOUNTER
Returned call to pt. He reports Mounjaro not available at [facility] and requests for it to be sent to [facility]. Rx request sent to Jacey Mckeon NP. Informed pt that the Rx will be sent, but the pharmacy will have to get a prior auth and it may take up to a week or so to get hi medication. Phone number to pharmacy provided to pt.       ----- Message from Michelle sent at 3/10/2025 10:08 AM CDT -----  Contact: pt @ [phone]  JEWEL CAMPOS calling regarding Patient Advice (message) for #pt is calling to speak with nurse concerning tirzepatide (MOUNJARO) 5 mg/0.5 mL PnIj, asking for call back

## 2025-03-31 ENCOUNTER — OFFICE VISIT (OUTPATIENT)
Dept: BARIATRICS | Facility: CLINIC | Age: 65
End: 2025-03-31
Payer: MEDICARE

## 2025-03-31 VITALS
WEIGHT: 252.38 LBS | HEIGHT: 70 IN | RESPIRATION RATE: 16 BRPM | HEART RATE: 69 BPM | BODY MASS INDEX: 36.13 KG/M2 | SYSTOLIC BLOOD PRESSURE: 147 MMHG | TEMPERATURE: 98 F | DIASTOLIC BLOOD PRESSURE: 83 MMHG

## 2025-03-31 DIAGNOSIS — I10 PRIMARY HYPERTENSION: ICD-10-CM

## 2025-03-31 DIAGNOSIS — K76.0 METABOLIC DYSFUNCTION-ASSOCIATED STEATOTIC LIVER DISEASE (MASLD): ICD-10-CM

## 2025-03-31 DIAGNOSIS — N18.32 TYPE 2 DIABETES MELLITUS WITH STAGE 3B CHRONIC KIDNEY DISEASE AND HYPERTENSION: Primary | ICD-10-CM

## 2025-03-31 DIAGNOSIS — E11.22 TYPE 2 DIABETES MELLITUS WITH STAGE 3B CHRONIC KIDNEY DISEASE AND HYPERTENSION: Primary | ICD-10-CM

## 2025-03-31 DIAGNOSIS — N18.32 STAGE 3B CHRONIC KIDNEY DISEASE (CKD): ICD-10-CM

## 2025-03-31 DIAGNOSIS — Z71.3 NUTRITIONAL COUNSELING: ICD-10-CM

## 2025-03-31 DIAGNOSIS — I10 HYPERTENSION, UNSPECIFIED TYPE: ICD-10-CM

## 2025-03-31 DIAGNOSIS — I12.9 TYPE 2 DIABETES MELLITUS WITH STAGE 3B CHRONIC KIDNEY DISEASE AND HYPERTENSION: Primary | ICD-10-CM

## 2025-03-31 DIAGNOSIS — E66.01 MORBID OBESITY: ICD-10-CM

## 2025-03-31 PROCEDURE — 99213 OFFICE O/P EST LOW 20 MIN: CPT | Mod: PBBFAC,PN | Performed by: NURSE PRACTITIONER

## 2025-03-31 PROCEDURE — 99999 PR PBB SHADOW E&M-EST. PATIENT-LVL III: CPT | Mod: PBBFAC,,, | Performed by: NURSE PRACTITIONER

## 2025-03-31 PROCEDURE — 99214 OFFICE O/P EST MOD 30 MIN: CPT | Mod: S$PBB,,, | Performed by: NURSE PRACTITIONER

## 2025-03-31 NOTE — PROGRESS NOTES
Medically Supervised Weight Loss Documentation    Date of Visit: 03/31/2025    Patient: Ben Lerma    Beginning Weight: 255    Last Weight: 255    Current Weight: 252  Current BMI: Body mass index is 36.22 kg/m².  Weight Change: -3    Goal: 200 lb    Vitals:   Vitals:    03/31/25 1556   BP: (!) 147/83   Pulse: 69   Resp: 16   Temp: 97.9 °F (36.6 °C)       Comorbidities:   Past Medical History:   Diagnosis Date    Allergy     Arthritis     Gout, unspecified     Hypertension     Metabolic dysfunction-associated steatotic liver disease (MASLD)     Mixed hyperlipidemia     Psoriasis     Stage 3b chronic kidney disease (CKD)     Type 2 diabetes mellitus with stage 3b chronic kidney disease and hypertension        Medications:  Medications Ordered Prior to Encounter[1]      Tanita Scale Body Composition:  Fat Percent:  43 %  Fat Mass:  108.6 lb  FFM:  143.8 lb  TBW: 110.4 lb  TBW %:  43.7 %  BMR: 1992 kcal      Diet Education Discussed: Recommend high protein, low carb meals- mainly meats and vegetables.  Wake 7 am  Breakfast:  eggs/omelet, skip 3x/wk  Snack: no desire to snack as much-- previously have large cheese/cracker/meat tray eat on all day  Lunch:  crackers, cheese, meat  Dinner after 6p:  main meal of the day, chicken/pork and some fried foods, potatoes with ground beef, Special K cereal with 2% milk  Does not like Cooked Vegetables, prefers raw- no squash, greens, or salad dressing/maldonado  Water: 30 oz  Wife cooks/shops  Coffee with milk/powder milk- drinks all day  Has decreased sugars with meals  Working to complete 3 meals per day    MVI/Minerals/Herbs:   Marijuana daily  Walking dog    Exercise: Recommend cardiovascular exercise, get HR over 100 for 20 minutes 3 times per week  Limited d/t gout       Behavorial/Counseling Plan for patient:  Falls reviewed with patient  Continue prescribed home medications  Tanita scale results reviewed with patient  Discussed muscle vs fat   Decreased 9 lb fat  Increase  10 lb muscle  Decreased 3 lb wate  Do not exceed 1800 calories/day  Diet: continue protocol  Do not skip meals- reiterated   Ensure 60 gm/protein per day at minimum  Focus on small, frequesnt meals with eating high protein, low carb  Clear, protein powder added to water, typically 20 gm protein- brands like Isopure Fusion, Oath, Seeq  Unflavored protein, Isopure, can add 25g to foods   Low carb, high protein foods  Continue to work on 3 meals per day, add protein shake  Exercise:   Increase activity as much as possible  Vitamins:   Continue with MVI       Medical Weight Loss  PO vs Injectables  PO  Topamax-   IR vs ER Amphetamines- Phentermine, Phendimetrazine  Tenuate  Contrave  Injectables  Discussed Wegovy vs Ozempic  Discussed Zepbound vs Mounjaro  Discussed muscle vs fat loss on GLP1  Will attempt for T2DM with stage 3b kidney failure and hypertension for Mounjaro 2.5 mg  Will increase next month to 5mg for 2 months  2/18/25  Mounjaro 2.5 mg x1 month  Mounjaro 5 mg x 2 months    3/10/25  Normal Pharmacy, [facility], does not have medications  Sent to [facility]- notified would need prior authorization, which can take time  Notified that will only fill one month at a time or insurance will not cover    Option chosen today, 3/31/2025-    Has completed 2nd injection of 2.5 mg- Thursday injections tolerating well  Will give Rx for 5 mg Mounjaro x1 month (insurance requires month-to-month or won't cover)  Constipation  Add Benefiber daily- once per day add to coffee  Stool Softeners (Colace) daily  Smooth Move Tea  Miralax PRN    Co morbidities:     1. Type 2 diabetes mellitus with stage 3b chronic kidney disease and hypertension        2. Stage 3b chronic kidney disease (CKD)        3. Primary hypertension        4. Morbid obesity        5. Metabolic dysfunction-associated steatotic liver disease (MASLD)        6. Hypertension, unspecified type        7. Nutritional counseling              : total  time spent for visit: 30 minutes          [1]   Current Outpatient Medications on File Prior to Visit   Medication Sig Dispense Refill    allopurinoL (ZYLOPRIM) 100 MG tablet Take 1 tablet (100 mg total) by mouth once daily. 30 tablet 1    amLODIPine (NORVASC) 5 MG tablet Take 1 tablet (5 mg total) by mouth once daily. 90 tablet 0    atorvastatin (LIPITOR) 10 MG tablet Take 1 tablet (10 mg total) by mouth once daily. 90 tablet 1    cetirizine (ZYRTEC) 10 MG tablet Take 10 mg by mouth every morning.      clobetasol 0.05% (TEMOVATE) 0.05 % Oint Apply 1 g topically 2 (two) times daily.      colchicine (COLCRYS) 0.6 mg tablet Take 2 tablets (1.2 mg total) by mouth once daily. Take two tablets once. May take one table an hour later if pain persists. for 1 day 3 tablet 0    diclofenac sodium (VOLTAREN ARTHRITIS PAIN) 1 % Gel Apply 2 g topically 4 (four) times daily. 150 g 3    risankizumab-rzaa (SKYRIZI) 150 mg/mL PnIj 150 mg at weeks 0, 4, and then every 12 weeks thereafter. 4 mL 3    tenofovir alafenamide (VEMLIDY) 25 mg Tab Take 1 tablet (25 mg total) by mouth once daily. 30 tablet 11    tirzepatide (MOUNJARO) 2.5 mg/0.5 mL PnIj Inject 2.5 mg into the skin every 7 days. 2 mL 0    tirzepatide (MOUNJARO) 5 mg/0.5 mL PnIj Inject 5 mg into the skin every 7 days. 2 mL 0     No current facility-administered medications on file prior to visit.

## 2025-04-02 ENCOUNTER — OFFICE VISIT (OUTPATIENT)
Dept: OTOLARYNGOLOGY | Facility: CLINIC | Age: 65
End: 2025-04-02
Payer: MEDICARE

## 2025-04-02 ENCOUNTER — CLINICAL SUPPORT (OUTPATIENT)
Dept: AUDIOLOGY | Facility: CLINIC | Age: 65
End: 2025-04-02
Payer: MEDICARE

## 2025-04-02 VITALS — HEART RATE: 89 BPM | SYSTOLIC BLOOD PRESSURE: 121 MMHG | DIASTOLIC BLOOD PRESSURE: 78 MMHG

## 2025-04-02 DIAGNOSIS — H93.13 TINNITUS OF BOTH EARS: ICD-10-CM

## 2025-04-02 DIAGNOSIS — H90.3 SENSORINEURAL HEARING LOSS (SNHL) OF BOTH EARS: Primary | ICD-10-CM

## 2025-04-02 DIAGNOSIS — Z77.122 EXPOSURE TO NOISE: ICD-10-CM

## 2025-04-02 DIAGNOSIS — J30.0 VASOMOTOR RHINITIS: ICD-10-CM

## 2025-04-02 PROCEDURE — 99999 PR PBB SHADOW E&M-EST. PATIENT-LVL III: CPT | Mod: PBBFAC,,, | Performed by: OTOLARYNGOLOGY

## 2025-04-02 PROCEDURE — 99203 OFFICE O/P NEW LOW 30 MIN: CPT | Mod: S$PBB,,, | Performed by: OTOLARYNGOLOGY

## 2025-04-02 PROCEDURE — 92567 TYMPANOMETRY: CPT | Mod: PBBFAC

## 2025-04-02 PROCEDURE — 99213 OFFICE O/P EST LOW 20 MIN: CPT | Mod: PBBFAC | Performed by: OTOLARYNGOLOGY

## 2025-04-02 PROCEDURE — 92557 COMPREHENSIVE HEARING TEST: CPT | Mod: PBBFAC

## 2025-04-02 NOTE — PROGRESS NOTES
Ben Lerma was seen today in the clinic for an audiologic evaluation.  Mr. Lerma reported constant bilateral tinnitus for many years. He reported some difficulty hearing.  Patient noted a history of noise exposure and intermittent brief episodes of dizziness.  Mr. Lerma denied otalgia and aural fullness.    Tympanometry revealed Type A in the right ear and Type A in the left ear.     Audiogram results revealed normal hearing sloping to a moderate sensorineural hearing loss (SNHL) in the right ear and normal hearing sloping to a moderate SNHL in the left ear.      Speech reception thresholds were noted at 15 dBHL in the right ear and 10 dBHL in the left ear.    Speech discrimination scores were 100% in the right ear and 100% in the left ear.    Recommendations:  Otologic evaluation  Annual audiogram or sooner if change perceived  Hearing protection in noise
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
## 2025-04-02 NOTE — PATIENT INSTRUCTIONS
Reviewed history, symptoms, exam findings, and audiogram.    Discussed likely cause of tinnitus and his hearing loss. Tinnitus management strategies discussed. Thankfully he does not find the tinnitus much of a problem.    Intermittent nasal drainage discussed - possibly causes reviewed.    Recommend return in one year with audiogram. Otherwise, follow up with our clinic for any ear, nose or throat problems ([phone]).

## 2025-04-20 PROBLEM — K76.0 METABOLIC DYSFUNCTION-ASSOCIATED STEATOTIC LIVER DISEASE (MASLD): Status: ACTIVE | Noted: 2025-04-20

## 2025-05-02 ENCOUNTER — OFFICE VISIT (OUTPATIENT)
Facility: CLINIC | Age: 65
End: 2025-05-02
Payer: MEDICARE

## 2025-05-02 ENCOUNTER — LAB VISIT (OUTPATIENT)
Dept: LAB | Facility: HOSPITAL | Age: 65
End: 2025-05-02
Attending: NURSE PRACTITIONER
Payer: MEDICARE

## 2025-05-02 VITALS — BODY MASS INDEX: 36.29 KG/M2 | WEIGHT: 253.5 LBS | HEIGHT: 70 IN

## 2025-05-02 DIAGNOSIS — I10 PRIMARY HYPERTENSION: ICD-10-CM

## 2025-05-02 DIAGNOSIS — R76.8 HEPATITIS C ANTIBODY TEST POSITIVE: ICD-10-CM

## 2025-05-02 DIAGNOSIS — K76.0 METABOLIC DYSFUNCTION-ASSOCIATED STEATOTIC LIVER DISEASE (MASLD): ICD-10-CM

## 2025-05-02 DIAGNOSIS — K74.60 CIRRHOSIS OF LIVER WITHOUT ASCITES, UNSPECIFIED HEPATIC CIRRHOSIS TYPE: Primary | ICD-10-CM

## 2025-05-02 DIAGNOSIS — N18.32 TYPE 2 DIABETES MELLITUS WITH STAGE 3B CHRONIC KIDNEY DISEASE, WITHOUT LONG-TERM CURRENT USE OF INSULIN: ICD-10-CM

## 2025-05-02 DIAGNOSIS — R76.8 HEPATITIS B CORE ANTIBODY POSITIVE: ICD-10-CM

## 2025-05-02 DIAGNOSIS — E11.22 TYPE 2 DIABETES MELLITUS WITH STAGE 3B CHRONIC KIDNEY DISEASE, WITHOUT LONG-TERM CURRENT USE OF INSULIN: ICD-10-CM

## 2025-05-02 DIAGNOSIS — E66.01 SEVERE OBESITY (BMI 35.0-39.9) WITH COMORBIDITY: ICD-10-CM

## 2025-05-02 LAB
EAG (OHS): 120 MG/DL (ref 68–131)
HBA1C MFR BLD: 5.8 % (ref 4–5.6)

## 2025-05-02 PROCEDURE — 99999 PR PBB SHADOW E&M-EST. PATIENT-LVL II: CPT | Mod: PBBFAC,,, | Performed by: NURSE PRACTITIONER

## 2025-05-02 PROCEDURE — 99212 OFFICE O/P EST SF 10 MIN: CPT | Mod: PBBFAC,PN | Performed by: NURSE PRACTITIONER

## 2025-05-02 PROCEDURE — 36415 COLL VENOUS BLD VENIPUNCTURE: CPT | Mod: PO

## 2025-05-02 PROCEDURE — 83036 HEMOGLOBIN GLYCOSYLATED A1C: CPT

## 2025-05-02 NOTE — PATIENT INSTRUCTIONS
Labs today  Ultrasound next week -- continue every 6 months  Continue Mary        CIRRHOSIS EDUCATION:  This is a helpful web site about cirrhosis: https://cirrhosiscare.ca/     Because you have cirrhosis, it is extremely important to obtain blood tests and an ultrasound every 6 months to screen for liver cancer (you are at risk for developing liver cancer due to increased scar tissue in the liver).     Signs and symptoms of worsening liver disease include jaundice (yellow skin/eyes), fluid in the abdomen (ascites), and confusion/disorientation/slowed thought processes due to hepatic encephalopathy (toxins building up when the liver is not working properly). You should seek medical attention if any of these things occur. Also, possible bleeding from esophageal varices (blood vessels in the stomach and foodpipe that can burst and cause bleeding). If you have symptoms of vomiting blood, blood in your stool, maroon or black stools, or coffee ground vomit, you should go to the emergency room immediately.     Cirrhosis can increase the risk of impaired liver function or liver failure; however, we will watch your liver function score (MELD score) closely with each clinic visit. A normal MELD score is 6, highest is 40. The MELD score helps to determine when we may need to consider evaluation for liver transplant.     Counseling  -- Strict abstinence from alcohol (includes beer, wine, and/or liquor)  -- Avoid non-steroidal anti-inflammatory drugs (NSAIDs) such as ibuprofen (Motrin, Advil), naproxen (Naprosyn, Aleve), meloxicam (Mobic)   -- Tylenol/acetaminophen is OKAY and should be used as needed for pain, no more than 2000 mg per day  -- Avoid raw shellfish due to the risk of Vibrio vulnificus infection  -- Low salt/sodium diet, less than 2000 mg per day   -- High protein diet to prevent muscle mass loss. Can drink protein shakes (Premier Protein is a great option because it is very high protein and low sugar). A  bedtime snack with protein can also be helpful. Example: peanut butter sandwich/crackers  -- Periodic upper endoscopy (EGD) to screen for varices (enlarged blood vessels) in the esophagus and stomach which can increase risk of bleeding  -- Increased risk of liver cancer associated with cirrhosis; therefore, continued screening with ultrasound (or CT / MRI) and AFP tumor marker every 6 months is recommended.

## 2025-05-02 NOTE — PROGRESS NOTES
[facility] - Established Patient    Last Clinic Visit: 1/10/25    Chief Complaint: Follow-up for positive HBV core Ab, fatty liver, cirrhosis       HISTORY     This is a 65 y.o. 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      Labs from 23 and 1/3/25 showed a positive hepatitis B core antibody. Hepatitis B surface Ag negative and DNA undetected, indicating no current/chronic infection.     HCV Ab also noted to be positive though RNA negative, indicating no current infection.      No known history of hepatitis or prior exposure.  No family history of hepatitis B.  He has a h/o drug use though >40 years ago. Has multiple tattoos that are many years old. He had 1 friend that had hepatitis and  from liver failure.   History of IVIG - no    Currently followed by Rheumatology for psoriasis.  We started Vemldiy for HBV prophylaxis given moderate risk of reactivation with cosentyx. He has since been switched to Skyrizi.     Liver enzymes noted to be mildly elevated for the last year, 30-60s. Abd US showed hepatomegaly and hepatic steatosis.    His risk factors for MASLD include-  BMI >35  HTN, HLD, DM    No alcohol use.    Fibrosis staging:  Fibroscan 10/2024 = F4 (kPa 19.8), S3  MRI elastography 2024 = F3-4 (kPa 4.55)  *MRI with irregular liver contour, suggestive of cirrhosis.     Interval history:   Doing well with Vemlidy, no issues.    Now followed by bariatric medicine.   Started Mounjaro and feels better - a little more energy, joint pain improving.     Updates on risk factors for MASLD:  Weight -- Body mass index is 36.38 kg/m².         Weight is down a few lb from last year                Dyslipidemia -- mildly elevated, on atorvastatin                              Insulin resistance / diabetes -- HgbA1c 5.6            Current symptoms of hepatic decompensation:        Ascites: no        LE edema: no         Hepatic encephalopathy: no        GI bleeding: no        Jaundice: no    Health Maintenance:  - HCC screening: MRI 11/2024 without focal hepatic lesion; AFP 2.9   - Varices screening: no EGD  - Hepatitis A vaccination: +immunity          Past medical history, surgical history, problem list, family history, social history, allergies: Reviewed and updated in the appropriate section of the electronic medical record.      Current Outpatient Medications   Medication Sig Dispense Refill    allopurinoL (ZYLOPRIM) 100 MG tablet Take 1 tablet (100 mg total) by mouth once daily. 30 tablet 1    amLODIPine (NORVASC) 5 MG tablet Take 1 tablet (5 mg total) by mouth once daily. 90 tablet 0    atorvastatin (LIPITOR) 10 MG tablet Take 1 tablet (10 mg total) by mouth once daily. (Patient not taking: Reported on 4/2/2025) 90 tablet 1    cetirizine (ZYRTEC) 10 MG tablet Take 10 mg by mouth every morning.      clobetasol 0.05% (TEMOVATE) 0.05 % Oint Apply 1 g topically 2 (two) times daily.      colchicine (COLCRYS) 0.6 mg tablet Take 2 tablets (1.2 mg total) by mouth once daily. Take two tablets once. May take one table an hour later if pain persists. for 1 day 3 tablet 0    diclofenac sodium (VOLTAREN ARTHRITIS PAIN) 1 % Gel Apply 2 g topically 4 (four) times daily. 150 g 3    risankizumab-rzaa (SKYRIZI) 150 mg/mL PnIj 150 mg at weeks 0, 4, and then every 12 weeks thereafter. 4 mL 3    tenofovir alafenamide (VEMLIDY) 25 mg Tab Take 1 tablet (25 mg total) by mouth once daily. 30 tablet 11    tirzepatide (MOUNJARO) 5 mg/0.5 mL PnIj Inject 5 mg into the skin every 7 days. (Patient not taking: Reported on 4/2/2025) 2 mL 0     No current facility-administered medications for this visit.     Medication list reviewed and updated.      Review of Systems - as per HPI  Constitutional: Negative for unexpected weight change.   Respiratory: Negative for shortness of breath.    Cardiovascular: Negative for leg swelling.  Gastrointestinal: Negative  for abdominal distention or abdominal pain. Negative for melena or hematemesis.  Musculoskeletal: Negative for myalgias.    Skin: Negative for jaundice or itching.  Neurological: Negative for confusion or slowed mentation. Negative for tremors.   Hematological: Does not bruise/bleed easily.       Physical Exam   Constitutional: No distress. Alert and oriented.  Eyes: No scleral icterus.   Pulmonary/Chest: Respiratory effort normal. No respiratory distress.   Abdominal: No distension, no ascites appreciated.   Extremities: No edema.   Neurological: No tremor.   Skin: No jaundice.   Psychiatric: Normal mood and affect. Speech, behavior, and thought content normal.       LABS & DIAGNOSTIC STUDIES     I have personally reviewed pertinent laboratory findings:    Lab Results   Component Value Date    ALT 27 01/03/2025    AST 20 01/03/2025    ALKPHOS 109 01/03/2025    BILITOT 0.4 01/03/2025    ALBUMIN 4.1 01/03/2025    INR 1.0 01/03/2025       Lab Results   Component Value Date    WBC 11.20 01/03/2025    HGB 13.9 (L) 01/03/2025    HCT 41.2 01/03/2025    MCV 94 01/03/2025     01/03/2025       Lab Results   Component Value Date     01/03/2025    K 4.0 01/03/2025    BUN 23 01/03/2025    CREATININE 1.5 (H) 01/03/2025       Lab Results   Component Value Date    FERRITIN 76.8 01/03/2025    FESATURATED 24 01/03/2025    HEPBSAG Negative 01/03/2025    HEPBCAB Positive (A) 01/03/2025    HEPCAB Reactive (A) 12/27/2023    HEPBDNA HBV DNA not detected 01/03/2025       Lab Results   Component Value Date    AFP 2.9 01/03/2025         I have personally reviewed the following result reports:  Abdominal US - 4/30/24  MRI - 11/13/24      ASSESSMENT & PLAN     65 y.o. male with:    1. Cirrhosis of liver without ascites, unspecified hepatic cirrhosis type    2. Type 2 diabetes mellitus with stage 3b chronic kidney disease, without long-term current use of insulin    3. Metabolic dysfunction-associated steatotic liver disease  (MASLD)    4. Hepatitis B core antibody positive    5. Hepatitis C antibody test positive    6. Severe obesity (BMI 35.0-39.9) with comorbidity    7. Primary hypertension          MELD 3.0: 11 at 1/3/2025 11:54 AM  MELD-Na: 10 at 1/3/2025 11:54 AM  Calculated from:  Serum Creatinine: 1.5 mg/dL at 1/3/2025 11:53 AM  Serum Sodium: 140 mmol/L (Using max of 137 mmol/L) at 1/3/2025 11:53 AM  Total Bilirubin: 0.4 mg/dL (Using min of 1 mg/dL) at 1/3/2025 11:53 AM  Serum Albumin: 4.1 g/dL (Using max of 3.5 g/dL) at 1/3/2025 11:53 AM  INR(ratio): 1 at 1/3/2025 11:54 AM  Age at listing (hypothetical): 64 years  Sex: Male at 1/3/2025 11:54 AM    MRI elastography and Fibroscan both suggest advanced fibrosis/cirrhosis, presumed due to MASH. MRI with irregular liver contour. Liver function is normal and no s/s hepatic decompensation.     HBV core Ab positive- on Vemlidy for prophylaxis given moderate risk of HBV reactivation with Skyrizi.     Recommendations:  MELD <15, no indication for liver transplant evaluation at this time. Reassured that liver function remains stable. Monitor LFTs every 6 months (include HBV serologies). Update labs today.  Continue HCC screening every 6 months with ultrasound and AFP, due this month.  Per AASLD guidelines, can hold off on varices screening given Fibroscan/MRI kPa scores and normal platelet count. No findings of portal HTN at this time. Can repeat Fibroscan in 6-12 months.  Continue Vemlidy  Weight loss efforts- discussed that GLPs can help with MASLD.  Mediterranean diet; limit carbohydrates/sugars and processed foods, increase protein/fiber. Moderate exercise (150 min/week) as tolerated.   Maintain good control of blood pressure, cholesterol, and blood sugar      Return to clinic in 6 months with labs/US.       Thank you for allowing me to participate in the care of [facility]        Duration of encounter: 30 min  This includes face to face time  and non-face to face time preparing to see the patient (eg, review of tests), obtaining and/or reviewing separately obtained history, documenting clinical information in the electronic or other health record, independently interpreting results and communicating results to the patient/family/caregiver, or Care coordination.

## 2025-05-05 DIAGNOSIS — R76.8 HEPATITIS B CORE ANTIBODY POSITIVE: ICD-10-CM

## 2025-05-05 DIAGNOSIS — D84.821 IMMUNOSUPPRESSION DUE TO DRUG THERAPY: ICD-10-CM

## 2025-05-05 DIAGNOSIS — Z79.899 IMMUNOSUPPRESSION DUE TO DRUG THERAPY: ICD-10-CM

## 2025-05-06 RX ORDER — TENOFOVIR ALAFENAMIDE 25 MG/1
25 TABLET ORAL DAILY
Qty: 30 TABLET | Refills: 11 | Status: ACTIVE | OUTPATIENT
Start: 2025-05-06

## 2025-05-07 ENCOUNTER — RESULTS FOLLOW-UP (OUTPATIENT)
Facility: CLINIC | Age: 65
End: 2025-05-07

## 2025-05-07 ENCOUNTER — TELEPHONE (OUTPATIENT)
Facility: CLINIC | Age: 65
End: 2025-05-07
Payer: MEDICARE

## 2025-05-07 NOTE — TELEPHONE ENCOUNTER
Pt was called and provider requested labs were schld for 5/9 with US appt.    [place]    ----- Message from Janeth Cuevas NP sent at 5/7/2025  9:44 AM CDT -----  Please schedule labs this month- CBC, CMP, INR, AFP, hep B testing  ----- Message -----  From: Lab, Background User  Sent: 5/2/2025   7:56 PM CDT  To: Janeth Cuevas NP

## 2025-05-09 ENCOUNTER — HOSPITAL ENCOUNTER (OUTPATIENT)
Dept: RADIOLOGY | Facility: HOSPITAL | Age: 65
Discharge: HOME | End: 2025-05-09
Attending: NURSE PRACTITIONER
Payer: MEDICARE

## 2025-05-09 ENCOUNTER — RESULTS FOLLOW-UP (OUTPATIENT)
Facility: CLINIC | Age: 65
End: 2025-05-09

## 2025-05-09 DIAGNOSIS — R76.8 HEPATITIS B CORE ANTIBODY POSITIVE: Primary | ICD-10-CM

## 2025-05-09 DIAGNOSIS — K74.60 CIRRHOSIS OF LIVER WITHOUT ASCITES, UNSPECIFIED HEPATIC CIRRHOSIS TYPE: ICD-10-CM

## 2025-05-09 DIAGNOSIS — N18.32 TYPE 2 DIABETES MELLITUS WITH STAGE 3B CHRONIC KIDNEY DISEASE AND HYPERTENSION: ICD-10-CM

## 2025-05-09 DIAGNOSIS — K76.0 METABOLIC DYSFUNCTION-ASSOCIATED STEATOTIC LIVER DISEASE (MASLD): ICD-10-CM

## 2025-05-09 DIAGNOSIS — Z11.59 ENCOUNTER FOR SCREENING FOR OTHER VIRAL DISEASES: ICD-10-CM

## 2025-05-09 DIAGNOSIS — E11.22 TYPE 2 DIABETES MELLITUS WITH STAGE 3B CHRONIC KIDNEY DISEASE AND HYPERTENSION: ICD-10-CM

## 2025-05-09 DIAGNOSIS — E66.01 MORBID OBESITY: ICD-10-CM

## 2025-05-09 DIAGNOSIS — I12.9 TYPE 2 DIABETES MELLITUS WITH STAGE 3B CHRONIC KIDNEY DISEASE AND HYPERTENSION: ICD-10-CM

## 2025-05-09 PROCEDURE — 76705 ECHO EXAM OF ABDOMEN: CPT | Mod: TC

## 2025-05-09 PROCEDURE — 76705 ECHO EXAM OF ABDOMEN: CPT | Mod: 26,,, | Performed by: RADIOLOGY

## 2025-05-09 RX ORDER — TIRZEPATIDE 5 MG/.5ML
5 INJECTION, SOLUTION SUBCUTANEOUS
Qty: 2 ML | Refills: 0 | Status: SHIPPED | OUTPATIENT
Start: 2025-05-09 | End: 2025-06-08

## 2025-05-11 PROBLEM — I70.0 AORTIC ATHEROSCLEROSIS: Status: ACTIVE | Noted: 2025-05-11

## 2025-05-11 PROBLEM — D84.821 IMMUNOSUPPRESSION DUE TO DRUG THERAPY: Status: ACTIVE | Noted: 2025-05-11

## 2025-05-11 PROBLEM — Z79.899 IMMUNOSUPPRESSION DUE TO DRUG THERAPY: Status: ACTIVE | Noted: 2025-05-11

## 2025-05-12 ENCOUNTER — OFFICE VISIT (OUTPATIENT)
Dept: FAMILY MEDICINE | Facility: CLINIC | Age: 65
End: 2025-05-12
Payer: MEDICARE

## 2025-05-12 ENCOUNTER — LAB VISIT (OUTPATIENT)
Dept: LAB | Facility: HOSPITAL | Age: 65
End: 2025-05-12
Attending: STUDENT IN AN ORGANIZED HEALTH CARE EDUCATION/TRAINING PROGRAM
Payer: MEDICARE

## 2025-05-12 VITALS
OXYGEN SATURATION: 97 % | SYSTOLIC BLOOD PRESSURE: 130 MMHG | WEIGHT: 250.25 LBS | DIASTOLIC BLOOD PRESSURE: 76 MMHG | BODY MASS INDEX: 35.83 KG/M2 | HEART RATE: 78 BPM | HEIGHT: 70 IN

## 2025-05-12 DIAGNOSIS — D84.821 IMMUNOSUPPRESSION DUE TO DRUG THERAPY: Primary | ICD-10-CM

## 2025-05-12 DIAGNOSIS — I70.0 AORTIC ATHEROSCLEROSIS: ICD-10-CM

## 2025-05-12 DIAGNOSIS — N18.32 TYPE 2 DIABETES MELLITUS WITH STAGE 3B CHRONIC KIDNEY DISEASE, WITHOUT LONG-TERM CURRENT USE OF INSULIN: ICD-10-CM

## 2025-05-12 DIAGNOSIS — N18.32 STAGE 3B CHRONIC KIDNEY DISEASE (CKD): ICD-10-CM

## 2025-05-12 DIAGNOSIS — R76.8 HEPATITIS C ANTIBODY TEST POSITIVE: ICD-10-CM

## 2025-05-12 DIAGNOSIS — Z79.899 IMMUNOSUPPRESSION DUE TO DRUG THERAPY: Primary | ICD-10-CM

## 2025-05-12 DIAGNOSIS — E11.22 TYPE 2 DIABETES MELLITUS WITH STAGE 3B CHRONIC KIDNEY DISEASE, WITHOUT LONG-TERM CURRENT USE OF INSULIN: ICD-10-CM

## 2025-05-12 DIAGNOSIS — L40.9 PSORIASIS: ICD-10-CM

## 2025-05-12 DIAGNOSIS — Z87.39 HISTORY OF GOUT: ICD-10-CM

## 2025-05-12 DIAGNOSIS — I10 PRIMARY HYPERTENSION: ICD-10-CM

## 2025-05-12 DIAGNOSIS — Z80.0 FAMILY HISTORY OF COLON CANCER IN MOTHER: ICD-10-CM

## 2025-05-12 DIAGNOSIS — L40.50 PSORIATIC ARTHRITIS: ICD-10-CM

## 2025-05-12 DIAGNOSIS — R76.8 HEPATITIS B CORE ANTIBODY POSITIVE: ICD-10-CM

## 2025-05-12 DIAGNOSIS — F12.90 MARIJUANA USE, CONTINUOUS: ICD-10-CM

## 2025-05-12 PROCEDURE — 99214 OFFICE O/P EST MOD 30 MIN: CPT | Mod: S$PBB,,, | Performed by: STUDENT IN AN ORGANIZED HEALTH CARE EDUCATION/TRAINING PROGRAM

## 2025-05-12 PROCEDURE — 87522 HEPATITIS C REVRS TRNSCRPJ: CPT

## 2025-05-12 PROCEDURE — 99213 OFFICE O/P EST LOW 20 MIN: CPT | Mod: PBBFAC,PO | Performed by: STUDENT IN AN ORGANIZED HEALTH CARE EDUCATION/TRAINING PROGRAM

## 2025-05-12 PROCEDURE — 36415 COLL VENOUS BLD VENIPUNCTURE: CPT | Mod: PO

## 2025-05-12 PROCEDURE — G2211 COMPLEX E/M VISIT ADD ON: HCPCS | Mod: S$PBB,,, | Performed by: STUDENT IN AN ORGANIZED HEALTH CARE EDUCATION/TRAINING PROGRAM

## 2025-05-12 PROCEDURE — 99999 PR PBB SHADOW E&M-EST. PATIENT-LVL III: CPT | Mod: PBBFAC,,, | Performed by: STUDENT IN AN ORGANIZED HEALTH CARE EDUCATION/TRAINING PROGRAM

## 2025-05-12 RX ORDER — RISANKIZUMAB-RZAA 150 MG/ML
150 INJECTION SUBCUTANEOUS
Qty: 1 ML | Refills: 3 | Status: ACTIVE | OUTPATIENT
Start: 2025-05-12

## 2025-05-12 RX ORDER — PREDNISONE 10 MG/1
10 TABLET ORAL DAILY
COMMUNITY

## 2025-05-12 RX ORDER — ROSUVASTATIN CALCIUM 10 MG/1
10 TABLET, COATED ORAL DAILY
Qty: 90 TABLET | Refills: 3 | Status: SHIPPED | OUTPATIENT
Start: 2025-05-12 | End: 2026-05-12

## 2025-05-12 NOTE — PROGRESS NOTES
[facility]  Office Visit Note     SUBJECTIVE:   HPI: Ben Lerma  is a 65 y.o. male here to [facility] care     Medical conditions:  Psoriasis, stage 3b CKD, severe obesity, type 2 diabetes, hep B ab positive, hep C ab positive, cirrhosis of liver without ascites, metabolic dysfunction - associated steatotic liver disease, psoriatic arthritis, history of gout    Meds:  Allopurinol, amlodipine, atorvastatin, colchicine, voltaren gel, skyrizi, vemlidy (hep B treatment), tirzepatide     Due:  Diabetic urine screening  Colorectal cancer screening  Vaccines     Specialty care:  - ENT  - bariatric   - hepatology   - rheumatology     Hepatology last visit (5/2/2025)   Notes are below     1. Cirrhosis of liver without ascites, unspecified hepatic cirrhosis type    2. Type 2 diabetes mellitus with stage 3b chronic kidney disease, without long-term current use of insulin    3. Metabolic dysfunction-associated steatotic liver disease (MASLD)    4. Hepatitis B core antibody positive    5. Hepatitis C antibody test positive    6. Severe obesity (BMI 35.0-39.9) with comorbidity    7. Primary hypertension       MELD 3.0: 11 at 1/3/2025 11:54 AM  MELD-Na: 10 at 1/3/2025 11:54 AM  Calculated from:  Serum Creatinine: 1.5 mg/dL at 1/3/2025 11:53 AM  Serum Sodium: 140 mmol/L (Using max of 137 mmol/L) at 1/3/2025 11:53 AM  Total Bilirubin: 0.4 mg/dL (Using min of 1 mg/dL) at 1/3/2025 11:53 AM  Serum Albumin: 4.1 g/dL (Using max of 3.5 g/dL) at 1/3/2025 11:53 AM  INR(ratio): 1 at 1/3/2025 11:54 AM  Age at listing (hypothetical): 64 years  Sex: Male at 1/3/2025 11:54 AM     MRI elastography and Fibroscan both suggest advanced fibrosis/cirrhosis, presumed due to MASH. MRI with irregular liver contour. Liver function is normal and no s/s hepatic decompensation.      HBV core Ab positive- on Vemlidy for prophylaxis given moderate risk of HBV reactivation with Skyrizi.      Recommendations:  MELD <15, no indication for liver  transplant evaluation at this time. Reassured that liver function remains stable. Monitor LFTs every 6 months (include HBV serologies). Update labs today.  Continue HCC screening every 6 months with ultrasound and AFP, due this month.  Per AASLD guidelines, can hold off on varices screening given Fibroscan/MRI kPa scores and normal platelet count. No findings of portal HTN at this time. Can repeat Fibroscan in 6-12 months.  Continue Vemlidy  Weight loss efforts- discussed that GLPs can help with MASLD.  Mediterranean diet; limit carbohydrates/sugars and processed foods, increase protein/fiber. Moderate exercise (150 min/week) as tolerated.   Maintain good control of blood pressure, cholesterol, and blood sugar     Return to clinic in 6 months with labs/US.     History of Present Illness    CHIEF COMPLAINT:  Patient presents today for follow up of multiple chronic conditions including psoriasis, chronic kidney disease, type 2 diabetes, and hepatitis B exposure.    RECENT INJURY:  He fell on a boat last Thursday while on a ladder, rolling onto his side while attempting to pull a plug from the bottom of the boat. X-rays at [facility] showed no fractures, but he was diagnosed with bruised muscle under his rib cage. He reports difficulty breathing, particularly with deep breaths on the left side. The pain initially improved on day one but significantly worsened the next day.    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 He has pain medication but avoids taking it due to constipation concerns.    HEPATITIS HISTORY:  He has history of hepatitis B exposure but denies active infection. He previously tested positive for hepatitis C while working in [facility], but subsequent specialist evaluation in the [place] confirmed negative status. Of note, Hep C ab came back positive in Dec 2023 but I do not see any follow up lab results since then     CURRENT  MEDICATIONS:  He takes Skyrizi for psoriasis, Venlafaxine, and Mounjaro which has been effective for weight loss.    FAMILY HISTORY:  Mother had colorectal cancer in her late 50s or early 60s and lupus.    SOCIAL HISTORY:  He is retired and . He uses marijuana for chronic pain management. He denies cigarette smoking and alcohol use.    IMMUNIZATIONS:  He received initial COVID-19 vaccination with Jarocho and Jarocho, followed by a subsequent dose of either Pfizer or Moderna.         Lab Visit on 05/09/2025   Component Date Value Ref Range Status    WBC 05/09/2025 12.10  3.90 - 12.70 K/uL Final    RBC 05/09/2025 4.65  4.60 - 6.20 M/uL Final    Hgb 05/09/2025 14.9  14.0 - 18.0 gm/dL Final    Hct 05/09/2025 44.6  40.0 - 54.0 % Final    MCV 05/09/2025 96  82 - 98 fL Final    MCH 05/09/2025 32.0 (H)  27.0 - 31.0 pg Final    MCHC 05/09/2025 33.4  32.0 - 36.0 g/dL Final    RDW 05/09/2025 14.0  11.5 - 14.5 % Final    Platelet Count 05/09/2025 234  150 - 450 K/uL Final    MPV 05/09/2025 10.6  9.2 - 12.9 fL Final    Sodium 05/09/2025 139  136 - 145 mmol/L Final    Potassium 05/09/2025 4.5  3.5 - 5.1 mmol/L Final    Chloride 05/09/2025 104  95 - 110 mmol/L Final    CO2 05/09/2025 27  23 - 29 mmol/L Final    Glucose 05/09/2025 91  70 - 110 mg/dL Final    BUN 05/09/2025 22  8 - 23 mg/dL Final    Creatinine 05/09/2025 1.6 (H)  0.5 - 1.4 mg/dL Final    Calcium 05/09/2025 10.0  8.7 - 10.5 mg/dL Final    Protein Total 05/09/2025 7.6  6.0 - 8.4 gm/dL Final    Albumin 05/09/2025 4.1  3.5 - 5.2 g/dL Final    Bilirubin Total 05/09/2025 0.6  0.1 - 1.0 mg/dL Final    ALP 05/09/2025 92  55 - 135 unit/L Final    AST 05/09/2025 21  10 - 40 unit/L Final    ALT 05/09/2025 26  10 - 44 unit/L Final    Anion Gap 05/09/2025 8  8 - 16 mmol/L Final    eGFR 05/09/2025 47 (L)  >60 mL/min/1.73/m2 Final    AFP-Tumor Marker 05/09/2025 2.4  0.0 - 8.4 ng/mL Final    PT 05/09/2025 11.3  9.0 - 12.5 seconds Final    INR 05/09/2025 1.0  0.8 - 1.2 Final    Lab Visit on 05/02/2025   Component Date Value Ref Range Status    Hemoglobin A1c 05/02/2025 5.8 (H)  4.0 - 5.6 % Final    Estimated Average Glucose 05/02/2025 120  68 - 131 mg/dL Final   Office Visit on 02/18/2025   Component Date Value Ref Range Status    POC Glucose 02/18/2025 88  70 - 110 MG/DL Final   Lab Visit on 01/03/2025   Component Date Value Ref Range Status    WBC 01/03/2025 11.20  3.90 - 12.70 K/uL Final    RBC 01/03/2025 4.39 (L)  4.60 - 6.20 M/uL Final    Hemoglobin 01/03/2025 13.9 (L)  14.0 - 18.0 g/dL Final    Hematocrit 01/03/2025 41.2  40.0 - 54.0 % Final    MCV 01/03/2025 94  82 - 98 fL Final    MCH 01/03/2025 31.7 (H)  27.0 - 31.0 pg Final    MCHC 01/03/2025 33.7  32.0 - 36.0 g/dL Final    RDW 01/03/2025 13.7  11.5 - 14.5 % Final    Platelets 01/03/2025 260  150 - 450 K/uL Final    MPV 01/03/2025 10.4  9.2 - 12.9 fL Final    Sodium 01/03/2025 140  136 - 145 mmol/L Final    Potassium 01/03/2025 4.0  3.5 - 5.1 mmol/L Final    Chloride 01/03/2025 107  95 - 110 mmol/L Final    CO2 01/03/2025 25  23 - 29 mmol/L Final    Glucose 01/03/2025 113 (H)  70 - 110 mg/dL Final    BUN 01/03/2025 23  8 - 23 mg/dL Final    Creatinine 01/03/2025 1.5 (H)  0.5 - 1.4 mg/dL Final    Calcium 01/03/2025 9.7  8.7 - 10.5 mg/dL Final    Total Protein 01/03/2025 7.7  6.0 - 8.4 g/dL Final    Albumin 01/03/2025 4.1  3.5 - 5.2 g/dL Final    Total Bilirubin 01/03/2025 0.4  0.1 - 1.0 mg/dL Final    Alkaline Phosphatase 01/03/2025 109  55 - 135 U/L Final    AST 01/03/2025 20  10 - 40 U/L Final    ALT 01/03/2025 27  10 - 44 U/L Final    eGFR 01/03/2025 51.3 (A)  >60 mL/min/1.73 m^2 Final    Anion Gap 01/03/2025 8  8 - 16 mmol/L Final    AFP 01/03/2025 2.9  0.0 - 8.4 ng/mL Final    Prothrombin Time 01/03/2025 10.8  9.0 - 12.5 sec Final    INR 01/03/2025 1.0  0.8 - 1.2 Final    Ferritin 01/03/2025 76.8  20.0 - 300.0 ng/mL Final    Iron 01/03/2025 74  45 - 160 ug/dL Final    Transferrin 01/03/2025 219  200 - 375 mg/dL Final     TIBC 01/03/2025 307  250 - 450 ug/dL Final    Saturated Iron 01/03/2025 24  20 - 50 % Final    Hep B Viral DNA IU/ML 01/03/2025 HBV DNA not detected  IU/mL Final    Log HBV IU/mL 01/03/2025 Test Not Performed  log10 IU/mL Final    HBV Test Information 01/03/2025 Comment   Final    Hepatitis B Surface Ag 01/03/2025 Negative  Negative Final    Hep B Core Total Ab 01/03/2025 Positive (A)  Negative Final    Miscellaneous Test Name 01/03/2025 HCVQN   Final    Specimen Type 01/03/2025 SERUM   Final    Test Result 01/03/2025 See result image under hyperlink   Final    Reference Lab 01/03/2025 SEE COMMENT   Final     Medications Ordered Prior to Encounter[1]  Past Medical History:   Diagnosis Date    Allergy     Arthritis     Gout, unspecified     Hypertension     Metabolic dysfunction-associated steatotic liver disease (MASLD)     Mixed hyperlipidemia     Psoriasis     Stage 3b chronic kidney disease (CKD)     Type 2 diabetes mellitus with stage 3b chronic kidney disease and hypertension      Past Surgical History:   Procedure Laterality Date    DENTAL SURGERY       Past Surgical History:   Procedure Laterality Date    DENTAL SURGERY       Family History   Problem Relation Name Age of Onset    Colon cancer Mother      Lupus Mother      Diabetes Mother      Obesity Mother      Hypertension Father       Marital Status:   Alcohol History:  reports that he does not currently use alcohol.  Tobacco History:  reports that he has quit smoking. His smoking use included cigarettes. He does not have any smokeless tobacco history on file.  Drug History:  reports current drug use. Drug: Marijuana.    Health Maintenance Topics with due status: Not Due       Topic Last Completion Date    Lipid Panel 10/18/2024    Diabetic Eye Exam 02/11/2025    Hemoglobin A1c 05/02/2025    Influenza Vaccine Not Due     Immunization History   Administered Date(s) Administered    COVID-19, vector-nr, [id number], PF ([facility]) 03/11/2021       Review of  "patient's allergies indicates:   Allergen Reactions    Lisinopril Swelling     Angioedema      Current Medications[2]    Review of Systems   Constitutional:  Negative for chills, fever and unexpected weight change.   Respiratory:  Negative for shortness of breath.    Cardiovascular:  Negative for chest pain.   Gastrointestinal:  Negative for blood in stool, nausea and vomiting.   Musculoskeletal:  Positive for arthralgias.        Left ribcage pain        OBJECTIVE:      Vitals:    05/12/25 0911   BP: 130/76   BP Location: Right arm   Patient Position: Sitting   Pulse: 78   SpO2: 97%   Weight: 113.5 kg (250 lb 3.6 oz)   Height: 5' 10" (1.778 m)     Physical Exam  Constitutional:       General: He is not in acute distress.     Appearance: He is obese. He is not ill-appearing or toxic-appearing.   HENT:      Head: Normocephalic and atraumatic.      Mouth/Throat:      Mouth: Mucous membranes are moist.      Pharynx: Uvula midline. No pharyngeal swelling.   Cardiovascular:      Rate and Rhythm: Normal rate and regular rhythm.   Pulmonary:      Effort: Pulmonary effort is normal. No tachypnea, bradypnea, accessory muscle usage, prolonged expiration or respiratory distress.      Breath sounds: Normal breath sounds. No stridor. No wheezing, rhonchi or rales.   Abdominal:      General: Bowel sounds are normal. There is no distension.      Palpations: Abdomen is soft.      Tenderness: There is no abdominal tenderness. There is no guarding or rebound.   Musculoskeletal:      Comments: Left ribcages tender to palpation but no signs of any bruising    Neurological:      General: No focal deficit present.      Mental Status: He is alert.   Psychiatric:         Mood and Affect: Mood normal.         Behavior: Behavior normal.        Assessment:       1. Immunosuppression due to drug therapy    2. Hepatitis B core antibody positive    3. Hepatitis C antibody test positive    4. Aortic atherosclerosis    5. Psoriasis    6. Psoriatic " arthritis    7. Type 2 diabetes mellitus with stage 3b chronic kidney disease, without long-term current use of insulin    8. Stage 3b chronic kidney disease (CKD)    9. Primary hypertension    10. History of gout    11. Family history of colon cancer in mother    12. Marijuana use, continuous        Plan:       Immunosuppression due to drug therapy  - Stable  - Continue to monitor     Hepatitis B core antibody positive  -     Hepatitis C Antibody; Future; Expected date: 05/12/2025  -     Hepatitis C RNA, Quantitative, PCR; Future; Expected date: 05/12/2025  - Continue with Vemlidy   - Continue to follow up with hepatology      Hepatitis C antibody test positive  - Reviewed complex medical history including hepatitis B exposure.  - Patient is under monitoring by a hepatologist.  - Clarified hepatitis C status - previous positive antibody test, but active infection status unclear.  - Ordered hepatitis C antibody and RNA tests.    Aortic atherosclerosis  - Recommended statin therapy     Psoriasis  - Continue to follow up with rheumatology  - Continue with Skyrizi     Psoriatic arthritis  - Reviewed complex medical history including psoriasis.  - Patient is currently taking Skyrizi for psoriasis treatment and is under the care of a rheumatologist for management.    Type 2 diabetes mellitus with stage 3b chronic kidney disease, without long-term current use of insulin  -     rosuvastatin (CRESTOR) 10 MG tablet; Take 1 tablet (10 mg total) by mouth once daily.  Dispense: 90 tablet; Refill: 3  -     Microalbumin/Creatinine Ratio, Urine; Future; Expected date: 05/12/2025  - Reviewed complex medical history including type 2 diabetes.  - Patient is not currently on statin therapy despite recommendations.  - Discussed the importance of statin therapy for diabetes patients to reduce cardiovascular risk.  - Ordered urine test to check for protein filtration related to diabetes.    Stage 3b chronic kidney disease (CKD)  -  Reviewed complex medical history including chronic kidney disease.  - Patient has reduced kidney function.  - Advised to hydrate with at least 64 oz of water daily.  - Instructed to avoid NSAIDs like ibuprofen, Advil, and Aleve due to potential kidney damage.    Primary hypertension  - BP stable  - Continue to monitor     History of gout  - Stable  - Continue with allopurinol     Family history of colon cancer in mother  - Patient's mother had colorectal cancer, diagnosed in her late 50s or early 60s.  - Strongly advised colorectal cancer screening via colonoscopy given family history.  - Instructed patient to contact the office when ready to schedule colonoscopy.  - Informed the patient that lack of screening with colonoscopy can lead to undiagnosed colorectal cancer which can be life-threatening and he verbalized understanding     Marijuana use, continuous  - Patient chronically smokes marijuana for pain relief.  - Discussed the health implications and advised to be mindful of the amount and frequency of use.    RIB CONTUSION AND FALL:  - Patient fell on a boat, resulting in rib pain.  - Assessment of recent rib injury showed no fracture on imaging, but bruised muscle under rib cage.  - Explained importance of deep breathing exercises after rib injury to prevent pneumonia.  - Instructed patient to perform these exercises regularly, especially when watching TV during commercial breaks.    FOLLOW-UP AND VACCINATIONS:  - Recommend pneumococcal and shingles vaccines due to age and chronic conditions.  - Educated on pneumococcal vaccine and shingles vaccine -- patient will consider them   - Follow up in 6 months with PA.     Counseled on age and gender appropriate medical preventative services, including cancer screenings, immunizations, overall nutritional health, need for a consistent exercise regimen and an overall push towards maintaining a vigorous and active lifestyle.        Arin Davis M.D.  5/12/2025    This  note was created using [employer] voice recognition software that occasionally misinterprets phrases or words            [1]   Current Outpatient Medications on File Prior to Visit   Medication Sig Dispense Refill    allopurinoL (ZYLOPRIM) 100 MG tablet Take 1 tablet (100 mg total) by mouth once daily. 30 tablet 1    amLODIPine (NORVASC) 5 MG tablet Take 1 tablet (5 mg total) by mouth once daily. 90 tablet 0    cetirizine (ZYRTEC) 10 MG tablet Take 10 mg by mouth every morning.      clobetasol 0.05% (TEMOVATE) 0.05 % Oint Apply 1 g topically 2 (two) times daily.      colchicine (COLCRYS) 0.6 mg tablet Take 2 tablets (1.2 mg total) by mouth once daily. Take two tablets once. May take one table an hour later if pain persists. for 1 day 3 tablet 0    diclofenac sodium (VOLTAREN ARTHRITIS PAIN) 1 % Gel Apply 2 g topically 4 (four) times daily. 150 g 3    predniSONE (DELTASONE) 10 MG tablet Take 10 mg by mouth once daily.      risankizumab-rzaa (SKYRIZI) 150 mg/mL PnIj 150 mg at weeks 0, 4, and then every 12 weeks thereafter. 4 mL 3    tenofovir alafenamide (VEMLIDY) 25 mg Tab Take 1 tablet (25 mg total) by mouth once daily. 30 tablet 11    tirzepatide (MOUNJARO) 5 mg/0.5 mL PnIj Inject 5 mg into the skin every 7 days. 2 mL 0    [DISCONTINUED] atorvastatin (LIPITOR) 10 MG tablet Take 1 tablet (10 mg total) by mouth once daily. (Patient not taking: Reported on 4/2/2025) 90 tablet 1     No current facility-administered medications on file prior to visit.   [2]   Current Outpatient Medications:     allopurinoL (ZYLOPRIM) 100 MG tablet, Take 1 tablet (100 mg total) by mouth once daily., Disp: 30 tablet, Rfl: 1    amLODIPine (NORVASC) 5 MG tablet, Take 1 tablet (5 mg total) by mouth once daily., Disp: 90 tablet, Rfl: 0    cetirizine (ZYRTEC) 10 MG tablet, Take 10 mg by mouth every morning., Disp: , Rfl:     clobetasol 0.05% (TEMOVATE) 0.05 % Oint, Apply 1 g topically 2 (two) times daily., Disp: , Rfl:     colchicine (COLCRYS) 0.6  mg tablet, Take 2 tablets (1.2 mg total) by mouth once daily. Take two tablets once. May take one table an hour later if pain persists. for 1 day, Disp: 3 tablet, Rfl: 0    diclofenac sodium (VOLTAREN ARTHRITIS PAIN) 1 % Gel, Apply 2 g topically 4 (four) times daily., Disp: 150 g, Rfl: 3    predniSONE (DELTASONE) 10 MG tablet, Take 10 mg by mouth once daily., Disp: , Rfl:     risankizumab-rzaa (SKYRIZI) 150 mg/mL PnIj, 150 mg at weeks 0, 4, and then every 12 weeks thereafter., Disp: 4 mL, Rfl: 3    tenofovir alafenamide (VEMLIDY) 25 mg Tab, Take 1 tablet (25 mg total) by mouth once daily., Disp: 30 tablet, Rfl: 11    tirzepatide (MOUNJARO) 5 mg/0.5 mL PnIj, Inject 5 mg into the skin every 7 days., Disp: 2 mL, Rfl: 0    rosuvastatin (CRESTOR) 10 MG tablet, Take 1 tablet (10 mg total) by mouth once daily., Disp: 90 tablet, Rfl: 3

## 2025-05-14 ENCOUNTER — RESULTS FOLLOW-UP (OUTPATIENT)
Dept: FAMILY MEDICINE | Facility: CLINIC | Age: 65
End: 2025-05-14

## 2025-05-14 LAB — HCV RNA SERPL NAA+PROBE-LOG IU: NOT DETECTED {LOG_IU}/ML

## 2025-06-06 ENCOUNTER — TELEPHONE (OUTPATIENT)
Facility: CLINIC | Age: 65
End: 2025-06-06
Payer: MEDICARE

## 2025-06-11 ENCOUNTER — OFFICE VISIT (OUTPATIENT)
Dept: RHEUMATOLOGY | Facility: CLINIC | Age: 65
End: 2025-06-11
Payer: MEDICARE

## 2025-06-11 DIAGNOSIS — D84.821 DRUG-INDUCED IMMUNODEFICIENCY: ICD-10-CM

## 2025-06-11 DIAGNOSIS — L40.50 PSORIATIC ARTHRITIS: Primary | ICD-10-CM

## 2025-06-11 DIAGNOSIS — L40.9 PSORIASIS: ICD-10-CM

## 2025-06-11 DIAGNOSIS — M1A.9XX0 CHRONIC GOUT WITHOUT TOPHUS, UNSPECIFIED CAUSE, UNSPECIFIED SITE: ICD-10-CM

## 2025-06-11 DIAGNOSIS — N18.32 STAGE 3B CHRONIC KIDNEY DISEASE (CKD): ICD-10-CM

## 2025-06-11 DIAGNOSIS — R76.8 HEPATITIS C ANTIBODY TEST POSITIVE: ICD-10-CM

## 2025-06-11 DIAGNOSIS — Z79.899 DRUG-INDUCED IMMUNODEFICIENCY: ICD-10-CM

## 2025-06-11 DIAGNOSIS — R76.8 HEPATITIS B CORE ANTIBODY POSITIVE: ICD-10-CM

## 2025-06-11 PROCEDURE — 98006 SYNCH AUDIO-VIDEO EST MOD 30: CPT | Mod: 95,,, | Performed by: STUDENT IN AN ORGANIZED HEALTH CARE EDUCATION/TRAINING PROGRAM

## 2025-06-11 PROCEDURE — G2211 COMPLEX E/M VISIT ADD ON: HCPCS | Mod: 95,,, | Performed by: STUDENT IN AN ORGANIZED HEALTH CARE EDUCATION/TRAINING PROGRAM

## 2025-06-11 NOTE — PROGRESS NOTES
RHEUMATOLOGY OUTPATIENT CLINIC NOTE    6/11/2025    Attending Rheumatologist: Norma Mendoza  Primary Care Provider: Arin Davis MD   Physician Requesting Consultation: No referring provider defined for this encounter.  Chief Complaint/Reason For Consultation:  No chief complaint on file.    The patient location is: home  The chief complaint leading to consultation is:  Psoriatic arthritis    Visit type: audiovisual    Each patient to whom he or she provides medical services by telemedicine is:  (1) informed of the relationship between the physician and patient and the respective role of any other health care provider with respect to management of the patient; and (2) notified that he or she may decline to receive medical services by telemedicine and may withdraw from such care at any time.    Notes:        Subjective:       ISSAC Lerma is a 65 y.o. White male with hypertension, diabetes who comes for evaluation of psoriatic arthritis     Today 6/2025  -he is doing well with skyrizi. Psoriasis is gone and joint pain and swelling has improved significantly.   -he follows with hepatology   -labs from 5/2025  -had a gout flare in the right knee that resolved in 2 days.     Answers submitted by the patient for this visit:  Rheumatology Questionnaire (Submitted on 6/11/2025)  fever: No  eye redness: No  mouth sores: No  headaches: No  shortness of breath: No  chest pain: No  trouble swallowing: No  diarrhea: Yes  constipation: No  unexpected weight change: No  genital sore: No  During the last 3 days, have you had a skin rash?: No  Bruises or bleeds easily: No  cough: No      6/2024  -last seen in 4/2024  -Current meds: otezla 30 mg BID and Skyrizi SC  -His psoriasis has improved significantly. It is essentially resolved and now has just hyperpigmentation.   -he never started allopurinol after last visit.   -he reports that he is still having intermittent bilateral wrist pain at times, noted  swelling as well. When this happens, he takes prednisone 10 mg for a couple of days. In mid June, he went to the ED and was treated as presumed gout flare but his symptoms were not controlled with colchicine. He also reports significant GI upset with colchicine.   -he forgot to do the labs  -follows with nephro for CKD and hepatology due to hep B core Ab positive on tenofovir.   -he developed acute pain in left wrist in mid June, went to the ED and was treated as gout flare   -no recent labs   -takes prednisone 10 mg as needed when he feels that a gout flare. Once in a month and a half   -colchicine caused GI upset.     Disease history    Diagnosis of PsO about 3-4 years ago, involving knees, legs, elbows, low back, scalp. Started on Otezla about a year ago but has not taken it consistently due to some diarrhea and upset stomach. He takes it at least 3 times per week. Reports that sometimes diarrhea is not present when he takes it.   He has a history of positive hep C antibody with negative VL and positive hep B core antibody. Because of this, no other treatment options have been explored yet. He has not seen hepatologist or ID.   He has history of gout for many years, affecting both feet and knees. Has taken indocin and colchicine in the past. Indocin caused mood changes and colchicine gave him upset stomach. Took allopurinol for a while many months ago. Last flare a couple of months ago.   Reports new onset bilateral wrist pain and swelling, also noted pain in multiple knuckles. Reports occasional pain in low back, no buttock pain. Morning stiffness of about 5 min.     He quit smoking cigarettes a while ago. Now only smokes marihuana. She does not drink alcohol. He is retired, used to work offshore. He rides a motocycle and has pain in hands with riding.     Pertinent labs and imaging  10/2022  Hep C antibody reactive  Hep C viral load negative  Hepatitis B core antibody reactive  Hepatitis B e Ab, surface  antibody negative  Quantiferon negative  KALEY 1:160 homogeneous   Uric acid 10.7    12/2023  RF, CCP negative  ESR and CRP elevated  SUA 9.4    Chronic comorbid conditions affecting medical decision making today:  Past Medical History:   Diagnosis Date    Allergy     Arthritis     Gout, unspecified     Hypertension     Metabolic dysfunction-associated steatotic liver disease (MASLD)     Mixed hyperlipidemia     Psoriasis     Stage 3b chronic kidney disease (CKD)     Type 2 diabetes mellitus with stage 3b chronic kidney disease and hypertension      Past Surgical History:   Procedure Laterality Date    DENTAL SURGERY       Family History   Problem Relation Name Age of Onset    Colon cancer Mother      Lupus Mother      Diabetes Mother      Obesity Mother      Hypertension Father       Social History     Substance and Sexual Activity   Alcohol Use Not Currently    Comment: very rarely 1 x yr     Social History     Tobacco Use   Smoking Status Former    Types: Cigarettes   Smokeless Tobacco Not on file     Social History     Substance and Sexual Activity   Drug Use Yes    Types: Marijuana       Current Outpatient Medications:     allopurinoL (ZYLOPRIM) 100 MG tablet, Take 1 tablet (100 mg total) by mouth once daily., Disp: 30 tablet, Rfl: 1    amLODIPine (NORVASC) 5 MG tablet, Take 1 tablet (5 mg total) by mouth once daily., Disp: 90 tablet, Rfl: 0    cetirizine (ZYRTEC) 10 MG tablet, Take 10 mg by mouth every morning., Disp: , Rfl:     clobetasol 0.05% (TEMOVATE) 0.05 % Oint, Apply 1 g topically 2 (two) times daily., Disp: , Rfl:     colchicine (COLCRYS) 0.6 mg tablet, Take 2 tablets (1.2 mg total) by mouth once daily. Take two tablets once. May take one table an hour later if pain persists. for 1 day, Disp: 3 tablet, Rfl: 0    diclofenac sodium (VOLTAREN ARTHRITIS PAIN) 1 % Gel, Apply 2 g topically 4 (four) times daily., Disp: 150 g, Rfl: 3    predniSONE (DELTASONE) 10 MG tablet, Take 10 mg by mouth once daily., Disp:  , Rfl:     risankizumab-rzaa (SKYRIZI) 150 mg/mL PnIj, Inject 150 mg into the skin every 12 weeks., Disp: 1 mL, Rfl: 3    rosuvastatin (CRESTOR) 10 MG tablet, Take 1 tablet (10 mg total) by mouth once daily., Disp: 90 tablet, Rfl: 3    tenofovir alafenamide (VEMLIDY) 25 mg Tab, Take 1 tablet (25 mg total) by mouth once daily., Disp: 30 tablet, Rfl: 11     Objective:         There were no vitals filed for this visit.      Physical Exam   Constitutional: He is oriented to person, place, and time. He appears well-developed.   HENT:   Head: Normocephalic.   Mouth/Throat: Mucous membranes are moist.   Pulmonary/Chest: Effort normal.   Abdominal: Soft.   Musculoskeletal:      Cervical back: Neck supple.      Comments: Cspine FROM no tenderness  Tspine FROM no tenderness  Lspine FROM no tenderness.  Shoulders: FROM; no synovitis;  Elbows: FROM; no synovitis; no tophi or nodules  Wrists: no tenderness or swelling   MCPs: FROM; no tenderness or synovitis    PIPs:FROM; no synovitis   DIPs: FROM; no synovitis;  HIPS: FROM  Knees: FROM; no synovitis; no instability  Ankles: FROM: no synovitis   Toes: no tenderness on palpation.     Lymphadenopathy:     He has no cervical adenopathy.   Neurological: He is alert and oriented to person, place, and time.   Skin: No rash noted.   No psoriatic plaques noted on exam. Now has hyperpigmentation in previous areas of psoriatic plaques    Vitals reviewed.       Media Information from 12/2023                        5/3    RIGHT LEG       Media Information      LEFT LEG       Media Information        Reviewed old and all outside pertinent medical records available.    All lab results personally reviewed and interpreted by me.  Lab Results   Component Value Date    WBC 12.10 05/09/2025    HGB 14.9 05/09/2025    HCT 44.6 05/09/2025    MCV 96 05/09/2025    MCH 32.0 (H) 05/09/2025    MCHC 33.4 05/09/2025    RDW 14.0 05/09/2025     05/09/2025    MPV 10.6 05/09/2025    NEUTROABS 4.1  "10/12/2022    LYMPHOPCT 39 10/12/2022       Lab Results   Component Value Date     05/09/2025    K 4.5 05/09/2025     05/09/2025    CO2 27 05/09/2025    GLU 91 05/09/2025    BUN 22 05/09/2025    CALCIUM 10.0 05/09/2025    PROT 7.6 05/09/2025    ALBUMIN 4.1 05/09/2025    BILITOT 0.6 05/09/2025    AST 21 05/09/2025    ALKPHOS 92 05/09/2025    ALT 26 05/09/2025       Lab Results   Component Value Date    COLORU Yellow 03/25/2024    APPEARANCEUA Clear 03/25/2024    SPECGRAV 1.010 03/25/2024    PHUR 6.0 03/25/2024    PROTEINUA Negative 03/25/2024    KETONESU Negative 03/25/2024    LEUKOCYTESUR 1+ (A) 03/25/2024    NITRITE Negative 03/25/2024    UROBILINOGEN Negative 03/25/2024       Lab Results   Component Value Date    CRP 10.9 (H) 12/27/2023       Lab Results   Component Value Date    RF <13.0 12/27/2023    SEDRATE 54 (H) 12/27/2023    CCPANTIBODIE <0.5 12/27/2023       No components found for: "25OHVITDTOT", "55RSUXOR5", "30BRSRUB6", "METHODNOTE"    Lab Results   Component Value Date    URICACID 10.5 (H) 07/08/2024       Lab Results   Component Value Date    HEPBSAB 40.17 12/27/2023    HEPBSAB Reactive 12/27/2023    HEPBSAG Negative 05/09/2025    HEPBSAG Negative 01/03/2025    HEPCAB Reactive (A) 12/27/2023     Imaging:  All imaging reviewed and independently interpreted by me.     ASSESSMENT / PLAN:     Ben Lerma is a 65 y.o. White male with  hypertension, diabetes who comes for evaluation of psoriatic arthritis     1. Severe Psoriasis, chronic and stable  -Extensive body surface involvement. PASI score of 11.9 -> 0.1 significant improvement of PsO with skyrizi   -continue Skyrizi 150 mg SC every 12 weeks. Favor IL-23 or Il-17 rather than TNF due to positive hepatitis B core Ab. Avoid MTX due to CKD    2. Psoriatic arthritis, chronic and stable  -low disease activity now. Having intermittent pain and swelling in bilateral wrists.   -See above for skyrizi.     3. History of gout - chronic and stable "   -SUA 10.7 in 2022 -> 9.4 .> 9.7  -has had many attacks in feet and knees.   -continue allopurinol 100 mg daily NOW. He feels he will be compliant since we are stopping otezla now    4. Hepatitis C antibody test positive  -VL in 10/2022 negative  -follows with hepatology  -was reassured     5. Hepatitis B core antibody positive  -Test was positive in 10/2022. Negative HepB surface Antigen  -On tenofovir for prophylaxis per hepatology as he is on skyrizi.     6. CKD stage 3b   -follows with nephrology  -avoid nephrotoxics     8. Other specified counseling  - over 10 minutes spent regarding below topics:  - Immunization counseling done.  - Weight loss counseling done.  - Nutrition and exercise counseling.  - Limitation of alcohol consumption.  - Regular exercise:  Aerobic and resistance.  - Medication counseling provided.  - provided temporary handicap tag due to PsA and gout flares.     9. Drug induced immunodeficiency  - recent labs reviewed  - no live vaccines  - vaccines per guidelines   - immunosuppression/infectious precautions reinforced      Follow up in about 6 months (around 12/11/2025).  Or sooner p.r.n. for disease management    Method of contact with patient concerns: [facility]    Disclaimer:  This note is prepared using voice recognition software and as such is likely to have errors and has not been proof read. Please contact me for questions.     Norma Mendoza M.D.  Rheumatology  [facility]

## 2025-06-20 DIAGNOSIS — E66.01 MORBID OBESITY: ICD-10-CM

## 2025-06-20 DIAGNOSIS — E11.22 TYPE 2 DIABETES MELLITUS WITH STAGE 3B CHRONIC KIDNEY DISEASE AND HYPERTENSION: ICD-10-CM

## 2025-06-20 DIAGNOSIS — N18.32 TYPE 2 DIABETES MELLITUS WITH STAGE 3B CHRONIC KIDNEY DISEASE AND HYPERTENSION: ICD-10-CM

## 2025-06-20 DIAGNOSIS — K76.0 METABOLIC DYSFUNCTION-ASSOCIATED STEATOTIC LIVER DISEASE (MASLD): ICD-10-CM

## 2025-06-20 DIAGNOSIS — I12.9 TYPE 2 DIABETES MELLITUS WITH STAGE 3B CHRONIC KIDNEY DISEASE AND HYPERTENSION: ICD-10-CM

## 2025-06-20 RX ORDER — TIRZEPATIDE 5 MG/.5ML
5 INJECTION, SOLUTION SUBCUTANEOUS
Qty: 2 ML | Refills: 0 | Status: SHIPPED | OUTPATIENT
Start: 2025-06-20 | End: 2025-07-20

## 2025-06-23 DIAGNOSIS — Z00.00 ENCOUNTER FOR MEDICARE ANNUAL WELLNESS EXAM: ICD-10-CM

## 2025-07-14 ENCOUNTER — TELEPHONE (OUTPATIENT)
Dept: BARIATRICS | Facility: CLINIC | Age: 65
End: 2025-07-14
Payer: MEDICARE

## 2025-07-31 ENCOUNTER — PATIENT MESSAGE (OUTPATIENT)
Dept: FAMILY MEDICINE | Facility: CLINIC | Age: 65
End: 2025-07-31
Payer: MEDICARE

## 2025-08-05 ENCOUNTER — TELEPHONE (OUTPATIENT)
Dept: BARIATRICS | Facility: CLINIC | Age: 65
End: 2025-08-05
Payer: MEDICARE

## 2025-08-05 DIAGNOSIS — K76.0 METABOLIC DYSFUNCTION-ASSOCIATED STEATOTIC LIVER DISEASE (MASLD): ICD-10-CM

## 2025-08-05 DIAGNOSIS — I12.9 TYPE 2 DIABETES MELLITUS WITH STAGE 3B CHRONIC KIDNEY DISEASE AND HYPERTENSION: ICD-10-CM

## 2025-08-05 DIAGNOSIS — E11.22 TYPE 2 DIABETES MELLITUS WITH STAGE 3B CHRONIC KIDNEY DISEASE AND HYPERTENSION: ICD-10-CM

## 2025-08-05 DIAGNOSIS — E66.01 MORBID OBESITY: ICD-10-CM

## 2025-08-05 DIAGNOSIS — N18.32 TYPE 2 DIABETES MELLITUS WITH STAGE 3B CHRONIC KIDNEY DISEASE AND HYPERTENSION: ICD-10-CM

## 2025-08-05 RX ORDER — TIRZEPATIDE 5 MG/.5ML
5 INJECTION, SOLUTION SUBCUTANEOUS
Qty: 2 ML | Refills: 0 | Status: SHIPPED | OUTPATIENT
Start: 2025-08-05 | End: 2025-09-04

## 2025-08-07 ENCOUNTER — TELEPHONE (OUTPATIENT)
Dept: BARIATRICS | Facility: CLINIC | Age: 65
End: 2025-08-07
Payer: MEDICARE